# Patient Record
Sex: FEMALE | Race: ASIAN | NOT HISPANIC OR LATINO | Employment: PART TIME | URBAN - METROPOLITAN AREA
[De-identification: names, ages, dates, MRNs, and addresses within clinical notes are randomized per-mention and may not be internally consistent; named-entity substitution may affect disease eponyms.]

---

## 2017-01-15 ENCOUNTER — OFFICE VISIT (OUTPATIENT)
Dept: URGENT CARE | Facility: URGENT CARE | Age: 27
End: 2017-01-15
Payer: COMMERCIAL

## 2017-01-15 VITALS
WEIGHT: 98.4 LBS | TEMPERATURE: 98.3 F | HEART RATE: 86 BPM | OXYGEN SATURATION: 97 % | RESPIRATION RATE: 14 BRPM | DIASTOLIC BLOOD PRESSURE: 50 MMHG | BODY MASS INDEX: 20.91 KG/M2 | SYSTOLIC BLOOD PRESSURE: 108 MMHG

## 2017-01-15 DIAGNOSIS — H02.843 SWOLLEN EYELID, RIGHT: Primary | ICD-10-CM

## 2017-01-15 PROCEDURE — 99213 OFFICE O/P EST LOW 20 MIN: CPT | Performed by: PHYSICIAN ASSISTANT

## 2017-01-15 NOTE — PATIENT INSTRUCTIONS
Conjunctivitis, Allergic    Conjunctivitis is an irritation of a thin membrane in the eye. This membrane is called the conjunctiva. It covers the white of the eye and the inside of the eyelid. The condition is often known as  pink eye  or  red eye  because the eye looks pink or red. The eye can also be swollen. A thick fluid may leak from the eyelid. The eye may itch and burn, and feel gritty or scratchy.  Allergic conjunctivitis is caused by an allergen. Allergens are substances that cause the body to react with certain symptoms. Allergens that cause eye irritation include things such as house dust or pollen in the air. This can occur seasonally, most often in the spring.  Home care    Eye drops may be prescribed to reduce itching and redness. Use these as directed. Otherwise, over-the-counter decongestant eye drops may be used.    Apply a cool compress (towel soaked in cool water) to the affected eye 3 to 4 times a day to reduce swelling and itching.    It is common to have mucus drainage during the night, causing the eyelids to become crusted by morning. Use a warm, wet cloth to wipe this away. You may also use saline irrigating solution or artificial tears to rinse away mucus in the eye. Do not patch the eye.    You may use acetaminophen or ibuprofen to control pain, unless another medicine was prescribed. (Note: If you have chronic liver or kidney disease, or if you have ever had a stomach ulcer or gastrointestinal bleeding, talk with your healthcare provider before using these medicines.)    Do not wear contact lenses until your eyes have healed and all symptoms are gone.  Follow-up care  Follow up with your healthcare provider, or as advised.  When to seek medical advice  Call your healthcare provider right away if any of these occur:    Increased eyelid swelling    New or worsening drainage from the eye    Increasing redness around the eye    Facial swelling    0097-1012 [employer] [address]  [place] [address]. All rights reserved. This information is not intended as a substitute for professional medical care. Always follow your healthcare professional's instructions.

## 2017-01-15 NOTE — NURSING NOTE
"Chief Complaint   Patient presents with     Eye Problem     Pt c/o spot on the inside of the right eye.        Initial /50 mmHg  Pulse 86  Temp(Src) 98.3  F (36.8  C) (Oral)  Wt 98 lb 6.4 oz (44.634 kg)  SpO2 97%  LMP 11/29/2016 (Approximate)  Breastfeeding? No Estimated body mass index is 20.91 kg/(m^2) as calculated from the following:    Height as of 12/29/16: 4' 9.5\" (1.461 m).    Weight as of this encounter: 98 lb 6.4 oz (44.634 kg).  BP completed using cuff size: regular    Katelin Copeland CMA (AAMA)      "

## 2017-01-15 NOTE — MR AVS SNAPSHOT
After Visit Summary   1/15/2017    Sahil Pimentel    MRN: [id number]           Patient Information     Date Of Birth          1990        Visit Information        Provider Department      1/15/2017 11:20 AM Brijesh Almazan PA [facility]        Today's Diagnoses     Swollen eyelid, right    -  1       Care Instructions      Conjunctivitis, Allergic    Conjunctivitis is an irritation of a thin membrane in the eye. This membrane is called the conjunctiva. It covers the white of the eye and the inside of the eyelid. The condition is often known as  pink eye  or  red eye  because the eye looks pink or red. The eye can also be swollen. A thick fluid may leak from the eyelid. The eye may itch and burn, and feel gritty or scratchy.  Allergic conjunctivitis is caused by an allergen. Allergens are substances that cause the body to react with certain symptoms. Allergens that cause eye irritation include things such as house dust or pollen in the air. This can occur seasonally, most often in the spring.  Home care    Eye drops may be prescribed to reduce itching and redness. Use these as directed. Otherwise, over-the-counter decongestant eye drops may be used.    Apply a cool compress (towel soaked in cool water) to the affected eye 3 to 4 times a day to reduce swelling and itching.    It is common to have mucus drainage during the night, causing the eyelids to become crusted by morning. Use a warm, wet cloth to wipe this away. You may also use saline irrigating solution or artificial tears to rinse away mucus in the eye. Do not patch the eye.    You may use acetaminophen or ibuprofen to control pain, unless another medicine was prescribed. (Note: If you have chronic liver or kidney disease, or if you have ever had a stomach ulcer or gastrointestinal bleeding, talk with your healthcare provider before using these medicines.)    Do not wear contact lenses until your eyes have  healed and all symptoms are gone.  Follow-up care  Follow up with your healthcare provider, or as advised.  When to seek medical advice  Call your healthcare provider right away if any of these occur:    Increased eyelid swelling    New or worsening drainage from the eye    Increasing redness around the eye    Facial swelling    4292-0418 [employer] [address] [place] [address]. All rights reserved. This information is not intended as a substitute for professional medical care. Always follow your healthcare professional's instructions.              Follow-ups after your visit        Additional Services     OPTOMETRY REFERRAL       Your provider has referred you to: AURELIAG: [facility] [phone]   [email or web address]    Please be aware that coverage of these services is subject to the terms and limitations of your health insurance plan.  Call member services at your health plan with any benefit or coverage questions.      Please bring the following with you to your appointment:    (1) Any X-Rays, CTs or MRIs which have been performed.  Contact the facility where they were done to arrange for  prior to your scheduled appointment.    (2) List of current medications  (3) This referral request   (4) Any documents/labs given to you for this referral                  Follow-up notes from your care team     Return if symptoms worsen or fail to improve.      Who to contact     If you have questions or need follow up information about today's clinic visit or your schedule please contact [facility] directly at [phone].  Normal or non-critical lab and imaging results will be communicated to you by MyChart, letter or phone within 4 business days after the clinic has received the results. If you do not hear from us within 7 days, please contact the clinic through MyChart or phone. If you have a critical or  "abnormal lab result, we will notify you by phone as soon as possible.  Submit refill requests through Ukash or call your pharmacy and they will forward the refill request to us. Please allow 3 business days for your refill to be completed.          Additional Information About Your Visit        Motionboxhart Information     Ukash lets you send messages to your doctor, view your test results, renew your prescriptions, schedule appointments and more. To sign up, go to [email or web address] . Click on \"Log in\" on the left side of the screen, which will take you to the Welcome page. Then click on \"Sign up Now\" on the right side of the page.     You will be asked to enter the access code listed below, as well as some personal information. Please follow the directions to create your username and password.     Your access code is: [email or web address]  Expires: 3/29/2017  4:01 PM     Your access code will  in 90 days. If you need help or a new code, please call your [place] clinic or [phone].        Care EveryWhere ID     This is your Care EveryWhere ID. This could be used by other organizations to access your [place] medical records  [id number]        Your Vitals Were     Pulse Temperature Respirations Pulse Oximetry Last Period Breastfeeding?    86 98.3  F (36.8  C) (Oral) 14 97% 2016 (Approximate) No       Blood Pressure from Last 3 Encounters:   01/15/17 108/50   16 97/58    Weight from Last 3 Encounters:   01/15/17 98 lb 6.4 oz (44.634 kg)   16 99 lb 3.2 oz (44.997 kg)              We Performed the Following     OPTOMETRY REFERRAL          Today's Medication Changes          These changes are accurate as of: 1/15/17 12:22 PM.  If you have any questions, ask your nurse or doctor.               Start taking these medicines.        Dose/Directions    ketotifen 0.025 % Soln ophthalmic solution   Commonly known as:  ZADITOR   Used for:  Swollen eyelid, right   Started by:  Brijesh Almazan " GILES Langford        Dose:  1 drop   Apply 1 drop to eye every 12 hours   Quantity:  1 Bottle   Refills:  0            Where to get your medicines      These medications were sent to [facility] [place] - [place], [place] - [address]  [address], [place] [address]     Phone:  [phone]    - ketotifen 0.025 % Soln ophthalmic solution             Primary Care Provider    None Specified       No primary provider on file.        Thank you!     Thank you for choosing [facility]  for your care. Our goal is always to provide you with excellent care. Hearing back from our patients is one way we can continue to improve our services. Please take a few minutes to complete the written survey that you may receive in the mail after your visit with us. Thank you!             Your Updated Medication List - Protect others around you: Learn how to safely use, store and throw away your medicines at www.disposemymeds.org.          This list is accurate as of: 1/15/17 12:22 PM.  Always use your most recent med list.                   Brand Name Dispense Instructions for use    ketotifen 0.025 % Soln ophthalmic solution    ZADITOR    1 Bottle    Apply 1 drop to eye every 12 hours

## 2017-01-15 NOTE — PROGRESS NOTES
SUBJECTIVE:                                                    Sahil Pimentel is a 26 year old female who presents to clinic today for the following health issues:       Eye(s) Problem  Rt lower lateral eye lid with swelling and pain a few days ago , swelling has improved significantly, and there is no pain now, but notes some blurry vision and a bump in the lateral eye lid crease region    Duration: 2-3 days    Description:  Location: right  Pain: no   Redness: YES  Discharge: no     Accompanying signs and symptoms:     History (Trauma, foreign body exposure,): None    Precipitating or alleviating factors (contact use): None    Therapies tried and outcome: None      visin intact      service used over the phone      No Known Allergies    No past medical history on file.      No current outpatient prescriptions on file prior to visit.  No current facility-administered medications on file prior to visit.    Social History   Substance Use Topics     Smoking status: Never Smoker      Smokeless tobacco: Not on file     Alcohol Use: No       ROS:  General: negative for fever  EYE: as above  ENT: as above    OBJECTIVE:  /50 mmHg  Pulse 86  Temp(Src) 98.3  F (36.8  C) (Oral)  Resp 14  Wt 98 lb 6.4 oz (44.634 kg)  SpO2 97%  LMP 11/29/2016 (Approximate)  Breastfeeding? No     corrected  Rt eye 20/20, left 20/20, both 20/20  General:   awake, alert, and cooperative.  NAD.   Head: Normocephalic, atraumatic.  Eyes: rt Conjunctiva clear,no exudates EOMI, PERRLA, no FB,   Rt lower lateral lid, at the lateral crease, with 5 x 2 mm region of prominent mucosal swelling, no drainage, no fluctuance, no erythema  Lungs: No respiratory distress  Neuro: Alert and oriented - normal speech.    ASSESSMENT:improving allergic rxn?    ICD-10-CM    1. Swollen eyelid, right H02.843 ketotifen (ZADITOR) 0.025 % SOLN ophthalmic solution     OPTOMETRY REFERRAL         PLAN:   Advised about symptoms which might herald more  serious problems.

## 2017-10-26 ENCOUNTER — OFFICE VISIT (OUTPATIENT)
Dept: NURSING | Facility: CLINIC | Age: 27
End: 2017-10-26
Payer: COMMERCIAL

## 2017-10-26 DIAGNOSIS — Z23 NEED FOR PROPHYLACTIC VACCINATION AND INOCULATION AGAINST INFLUENZA: Primary | ICD-10-CM

## 2017-10-26 PROCEDURE — 99207 ZZC NO CHARGE NURSE ONLY: CPT

## 2017-10-26 PROCEDURE — 90686 IIV4 VACC NO PRSV 0.5 ML IM: CPT

## 2017-10-26 PROCEDURE — 90471 IMMUNIZATION ADMIN: CPT

## 2017-10-26 NOTE — MR AVS SNAPSHOT
"              After Visit Summary   10/26/2017    Sahil Pimentel    MRN: [id number]           Patient Information     Date Of Birth          1990        Visit Information        Provider Department      10/26/2017 8:45 AM [employer]IE [employer]RAM[employer]; [facility]CILL[facility]        Today's Diagnoses     Need for prophylactic vaccination and inoculation against influenza    -  1       Follow-ups after your visit        Who to contact     If you have questions or need follow up information about today's clinic visit or your schedule please contact [facility] directly at [phone].  Normal or non-critical lab and imaging results will be communicated to you by Populishart, letter or phone within 4 business days after the clinic has received the results. If you do not hear from us within 7 days, please contact the clinic through Populishart or phone. If you have a critical or abnormal lab result, we will notify you by phone as soon as possible.  Submit refill requests through MogiMe or call your pharmacy and they will forward the refill request to us. Please allow 3 business days for your refill to be completed.          Additional Information About Your Visit        MyChart Information     MogiMe lets you send messages to your doctor, view your test results, renew your prescriptions, schedule appointments and more. To sign up, go to [email or web address] . Click on \"Log in\" on the left side of the screen, which will take you to the Welcome page. Then click on \"Sign up Now\" on the right side of the page.     You will be asked to enter the access code listed below, as well as some personal information. Please follow the directions to create your username and password.     Your access code is: [email or web address]  Expires: 2018  9:10 AM     Your access code will  in 90 days. If you need help or a new code, please call your [facility] or [phone].        Care EveryWhere " ID     This is your Care EveryWhere ID. This could be used by other organizations to access your [place] medical records  [id number]         Blood Pressure from Last 3 Encounters:   01/15/17 108/50   12/29/16 97/58    Weight from Last 3 Encounters:   01/15/17 98 lb 6.4 oz (44.6 kg)   12/29/16 99 lb 3.2 oz (45 kg)              We Performed the Following     FLU VAC, SPLIT VIRUS IM > 3 YO (QUADRIVALENT) [35711]     Vaccine Administration, Initial [75625]        Primary Care Provider Fax #    Provider Not In System [phone]                Equal Access to Services     ANTHONY SIMPSON : Hadii aad tori hadabram Somelissa, wazaidda lubolivaradaha, christa kaalmada tyra, jorge reyes . So [facility] [phone].    ATENCIÓN: Si habla español, tiene a guallpa disposición servicios gratuitos de asistencia lingüística. Llame al [phone].    We comply with applicable federal civil rights laws and [place] laws. We do not discriminate on the basis of race, color, national origin, age, disability, sex, sexual orientation, or gender identity.            Thank you!     Thank you for choosing [facility]  for your care. Our goal is always to provide you with excellent care. Hearing back from our patients is one way we can continue to improve our services. Please take a few minutes to complete the written survey that you may receive in the mail after your visit with us. Thank you!             Your Updated Medication List - Protect others around you: Learn how to safely use, store and throw away your medicines at www.disposemymeds.org.          This list is accurate as of: 10/26/17  9:10 AM.  Always use your most recent med list.                   Brand Name Dispense Instructions for use Diagnosis    ketotifen 0.025 % Soln ophthalmic solution    ZADITOR    1 Bottle    Apply 1 drop to eye every 12 hours    Swollen eyelid, right

## 2017-10-26 NOTE — NURSING NOTE

## 2019-10-07 ENCOUNTER — OFFICE VISIT (OUTPATIENT)
Dept: FAMILY MEDICINE | Facility: CLINIC | Age: 29
End: 2019-10-07
Payer: COMMERCIAL

## 2019-10-07 VITALS
HEIGHT: 58 IN | OXYGEN SATURATION: 97 % | DIASTOLIC BLOOD PRESSURE: 70 MMHG | SYSTOLIC BLOOD PRESSURE: 108 MMHG | TEMPERATURE: 97.1 F | WEIGHT: 103 LBS | RESPIRATION RATE: 14 BRPM | HEART RATE: 77 BPM | BODY MASS INDEX: 21.62 KG/M2

## 2019-10-07 DIAGNOSIS — Z00.00 ROUTINE GENERAL MEDICAL EXAMINATION AT A HEALTH CARE FACILITY: Primary | ICD-10-CM

## 2019-10-07 DIAGNOSIS — Z13.6 CARDIOVASCULAR SCREENING; LDL GOAL LESS THAN 100: ICD-10-CM

## 2019-10-07 LAB
CHOLEST SERPL-MCNC: 151 MG/DL
GLUCOSE SERPL-MCNC: 81 MG/DL (ref 70–99)
HDLC SERPL-MCNC: 49 MG/DL
LDLC SERPL CALC-MCNC: 92 MG/DL
NONHDLC SERPL-MCNC: 102 MG/DL
TRIGL SERPL-MCNC: 52 MG/DL

## 2019-10-07 PROCEDURE — 82947 ASSAY GLUCOSE BLOOD QUANT: CPT | Performed by: PHYSICIAN ASSISTANT

## 2019-10-07 PROCEDURE — 90471 IMMUNIZATION ADMIN: CPT | Performed by: PHYSICIAN ASSISTANT

## 2019-10-07 PROCEDURE — 99395 PREV VISIT EST AGE 18-39: CPT | Mod: 25 | Performed by: PHYSICIAN ASSISTANT

## 2019-10-07 PROCEDURE — 36415 COLL VENOUS BLD VENIPUNCTURE: CPT | Performed by: PHYSICIAN ASSISTANT

## 2019-10-07 PROCEDURE — 80061 LIPID PANEL: CPT | Performed by: PHYSICIAN ASSISTANT

## 2019-10-07 PROCEDURE — 90686 IIV4 VACC NO PRSV 0.5 ML IM: CPT | Performed by: PHYSICIAN ASSISTANT

## 2019-10-07 ASSESSMENT — MIFFLIN-ST. JEOR: SCORE: 1071.95

## 2019-10-07 NOTE — LETTER
October 8, 2019      Sahil Pimentel  [address]  [place] [address]        Dear ,    We are writing to inform you of your test results.      -HDL(good) cholesterol level is low which can increase your heart disease risk.  A diet high in fat and simple carbohydrates, genetics can contribute to this. Your LDL(bad) cholesterol and trigylceride levels are normal.  ADVISE: exercising 150 minutes of aerobic exercise per week (30 minutes 5 days per week or 50 minutes 3 days per week are options), and omega-3 fatty acids (fish oil) 0963-7520 mg daily are helpful to improve this.  In 12 months, you should recheck your fasting cholesterol panel by scheduling a lab-only appointment.  -Glucose (diabetic screening test) is normal.      Resulted Orders   Glucose   Result Value Ref Range    Glucose 81 70 - 99 mg/dL      Comment:      Fasting specimen   Lipid Profile (Chol, Trig, HDL, LDL calc)   Result Value Ref Range    Cholesterol 151 <200 mg/dL    Triglycerides 52 <150 mg/dL      Comment:      Fasting specimen    HDL Cholesterol 49 (L) >49 mg/dL    LDL Cholesterol Calculated 92 <100 mg/dL      Comment:      Desirable:       <100 mg/dl    Non HDL Cholesterol 102 <130 mg/dL       If you have any questions or concerns, please call the clinic at the number listed above.       Sincerely,          Marcellus Sherwood PA-C

## 2019-10-07 NOTE — PROGRESS NOTES
SUBJECTIVE:   CC: Sahil Pimentel is an 29 year old woman who presents for preventive health visit.     Healthy Habits:    Do you get at least three servings of calcium containing foods daily (dairy, green leafy vegetables, etc.)? yes    Amount of exercise or daily activities, outside of work: 15 min/day    Problems taking medications regularly No    Medication side effects: No    Have you had an eye exam in the past two years? no    Do you see a dentist twice per year? no    Do you have sleep apnea, excessive snoring or daytime drowsiness?no          Today's PHQ-2 Score:   PHQ-2 ( 1999 Pfizer) 10/7/2019 12/29/2016   Q1: Little interest or pleasure in doing things 0 0   Q2: Feeling down, depressed or hopeless 0 0   PHQ-2 Score 0 0       Abuse: Current or Past(Physical, Sexual or Emotional)- No  Do you feel safe in your environment? Yes    Social History     Tobacco Use     Smoking status: Never Smoker     Smokeless tobacco: Never Used   Substance Use Topics     Alcohol use: No     Alcohol/week: 0.0 standard drinks     If you drink alcohol do you typically have >3 drinks per day or >7 drinks per week? No                     Reviewed orders with patient.  Reviewed health maintenance and updated orders accordingly - Yes  Patient Active Problem List   Diagnosis     Routine history and physical examination of adult     Immigrant with language difficulty     Past Surgical History:   Procedure Laterality Date     NO HISTORY OF SURGERY         Social History     Tobacco Use     Smoking status: Never Smoker     Smokeless tobacco: Never Used   Substance Use Topics     Alcohol use: No     Alcohol/week: 0.0 standard drinks     Family History   Problem Relation Age of Onset     Family History Negative Mother      Glaucoma No family hx of      Macular Degeneration No family hx of      Retinal detachment No family hx of          No current outpatient medications on file.     No Known Allergies  No lab results found.     Mammogram  "not appropriate for this patient based on age.    Pertinent mammograms are reviewed under the imaging tab.  History of abnormal Pap smear: NO - age 30-65 PAP every 5 years with negative HPV co-testing recommended  PAP / HPV 12/29/2016   PAP NIL     Reviewed and updated as needed this visit by clinical staff  Tobacco  Allergies  Meds  Fam Hx  Soc Hx        Reviewed and updated as needed this visit by Provider  Tobacco  Fam Hx  Soc Hx       Past Medical History:   Diagnosis Date     NO ACTIVE PROBLEMS       Past Surgical History:   Procedure Laterality Date     NO HISTORY OF SURGERY       OB History   No obstetric history on file.       ROS:  CONSTITUTIONAL: NEGATIVE for fever, chills, change in weight  INTEGUMENTARU/SKIN: NEGATIVE for worrisome rashes, moles or lesions  EYES: NEGATIVE for vision changes or irritation  ENT: NEGATIVE for ear, mouth and throat problems  RESP: NEGATIVE for significant cough or SOB  BREAST: NEGATIVE for masses, tenderness or discharge  CV: NEGATIVE for chest pain, palpitations or peripheral edema  GI: NEGATIVE for nausea, abdominal pain, heartburn, or change in bowel habits  : NEGATIVE for unusual urinary or vaginal symptoms. Periods are regular.  MUSCULOSKELETAL: NEGATIVE for significant arthralgias or myalgia  NEURO: NEGATIVE for weakness, dizziness or paresthesias  PSYCHIATRIC: NEGATIVE for changes in mood or affect    OBJECTIVE:   /70   Pulse 77   Temp 97.1  F (36.2  C) (Tympanic)   Resp 14   Ht 1.473 m (4' 10\")   Wt 46.7 kg (103 lb)   LMP 09/07/2019   SpO2 97%   BMI 21.53 kg/m    EXAM:  GENERAL: healthy, alert and no distress  EYES: Eyes grossly normal to inspection, PERRL and conjunctivae and sclerae normal  HENT: ear canals and TM's normal, nose and mouth without ulcers or lesions  NECK: no adenopathy, no asymmetry, masses, or scars and thyroid normal to palpation  RESP: lungs clear to auscultation - no rales, rhonchi or wheezes  BREAST: normal without masses, " "tenderness or nipple discharge and no palpable axillary masses or adenopathy  CV: regular rate and rhythm, normal S1 S2, no S3 or S4, no murmur, click or rub, no peripheral edema   ABDOMEN: soft, nontender, no hepatosplenomegaly, no masses and bowel sounds normal  MS: no gross musculoskeletal defects noted, no edema  SKIN: no suspicious lesions or rashes  NEURO: Normal strength and tone, mentation intact and speech normal  PSYCH: mentation appears normal, affect normal/bright    Diagnostic Test Results:  Labs reviewed in Epic    ASSESSMENT/PLAN:   1. Routine general medical examination at a health care facility    2. CARDIOVASCULAR SCREENING; LDL GOAL LESS THAN 100  - Glucose  - Lipid Profile (Chol, Trig, HDL, LDL calc)    COUNSELING:   Reviewed preventive health counseling, as reflected in patient instructions       Regular exercise       Healthy diet/nutrition       Immunizations    Vaccinated for: Influenza          Estimated body mass index is 21.53 kg/m  as calculated from the following:    Height as of this encounter: 1.473 m (4' 10\").    Weight as of this encounter: 46.7 kg (103 lb).         reports that she has never smoked. She has never used smokeless tobacco.      Counseling Resources:  ATP IV Guidelines  Pooled Cohorts Equation Calculator  Breast Cancer Risk Calculator  FRAX Risk Assessment  ICSI Preventive Guidelines  Dietary Guidelines for Americans, 2010  USDA's MyPlate  ASA Prophylaxis  Lung CA Screening    Marcellus Sherwood PA-C  [facility]  "

## 2019-10-08 NOTE — RESULT ENCOUNTER NOTE
Letter sent to patient with results.    Marcellus Sherwood PA-C  [facility]

## 2020-03-10 ENCOUNTER — HEALTH MAINTENANCE LETTER (OUTPATIENT)
Age: 30
End: 2020-03-10

## 2020-11-02 ENCOUNTER — OFFICE VISIT (OUTPATIENT)
Dept: FAMILY MEDICINE | Facility: CLINIC | Age: 30
End: 2020-11-02
Payer: COMMERCIAL

## 2020-11-02 VITALS
BODY MASS INDEX: 21.53 KG/M2 | DIASTOLIC BLOOD PRESSURE: 58 MMHG | WEIGHT: 103 LBS | SYSTOLIC BLOOD PRESSURE: 98 MMHG | HEART RATE: 80 BPM | OXYGEN SATURATION: 99 % | TEMPERATURE: 97 F

## 2020-11-02 DIAGNOSIS — Z32.01 PREGNANCY TEST POSITIVE: Primary | ICD-10-CM

## 2020-11-02 DIAGNOSIS — Z23 NEED FOR PROPHYLACTIC VACCINATION AND INOCULATION AGAINST INFLUENZA: ICD-10-CM

## 2020-11-02 LAB — HCG UR QL: POSITIVE

## 2020-11-02 PROCEDURE — 99213 OFFICE O/P EST LOW 20 MIN: CPT | Mod: 25 | Performed by: PHYSICIAN ASSISTANT

## 2020-11-02 PROCEDURE — 81025 URINE PREGNANCY TEST: CPT | Performed by: PHYSICIAN ASSISTANT

## 2020-11-02 PROCEDURE — 90471 IMMUNIZATION ADMIN: CPT | Performed by: PHYSICIAN ASSISTANT

## 2020-11-02 PROCEDURE — 90686 IIV4 VACC NO PRSV 0.5 ML IM: CPT | Performed by: PHYSICIAN ASSISTANT

## 2020-11-02 RX ORDER — PRENATAL VIT/IRON FUM/FOLIC AC 27MG-0.8MG
1 TABLET ORAL DAILY
Qty: 90 TABLET | Refills: 3 | Status: SHIPPED | OUTPATIENT
Start: 2020-11-02 | End: 2024-07-29

## 2020-11-02 NOTE — PROGRESS NOTES
Subjective     Sahil Pimentel is a 30 year old female who presents to clinic today for the following health issues:    HPI       Pregnancy confirmation:    LMP: 2020.  Sahil is a  female, she is here with her  today to confirm that she is pregnant. She took a home pregnancy test 2 days ago and this was positive.  They have been trying to become pregnant and are excited by this news.  She is not having any abdominal pain, bleeding, n/v or fevers.           Review of Systems   Constitutional, HEENT, cardiovascular, pulmonary, gi and gu systems are negative, except as otherwise noted.      Objective    BP 98/58   Pulse 80   Temp 97  F (36.1  C)   Wt 46.7 kg (103 lb)   LMP 2020   SpO2 99%   BMI 21.53 kg/m    Body mass index is 21.53 kg/m .  Physical Exam   GENERAL: healthy, alert and no distress  RESP: lungs clear to auscultation - no rales, rhonchi or wheezes  CV: regular rate and rhythm, normal S1 S2, no S3 or S4, no murmur, click or rub, no peripheral edema   ABDOMEN: soft, nontender, no hepatosplenomegaly, no masses and bowel sounds normal  PSYCH: mentation appears normal, affect normal/bright        Results for orders placed or performed in visit on 20   HCG Qual, Urine ([id number])     Status: Abnormal   Result Value Ref Range    HCG Qual Urine Positive (A) NEG^Negative         Assessment & Plan     Pregnancy test positive  Prenatal vitamin prescribed.  Discussed positive test including diet and exercise recommendations and next steps.  She would like a referral [facility].  Advised that she is approx 6 weeks pregnant, estimated due date in 2020. All questions answered  - HCG Qual, Urine ([id number])  - Prenatal Vit-Fe Fumarate-FA (PRENATAL MULTIVITAMIN W/IRON) 27-0.8 MG tablet; Take 1 tablet by mouth daily  - OB/GYN REFERRAL    Need for prophylactic vaccination and inoculation against influenza  - INFLUENZA VACCINE IM > 6 MONTHS VALENT IIV4 [01401]            Return in about 2  weeks (around 11/16/2020) for please follow up in 2 weeks with your OBGYN.    AJAY Mccall [facility] [address] [place]

## 2020-12-27 ENCOUNTER — HEALTH MAINTENANCE LETTER (OUTPATIENT)
Age: 30
End: 2020-12-27

## 2021-01-19 ENCOUNTER — TELEPHONE (OUTPATIENT)
Dept: FAMILY MEDICINE | Facility: CLINIC | Age: 31
End: 2021-01-19

## 2021-01-19 NOTE — TELEPHONE ENCOUNTER
Needs of attention regarding:  -Cervical Cancer Screening    Health Maintenance Topics with due status: Overdue       Topic Date Due    HIV SCREENING 03/25/2003    HEPATITIS C SCREENING 03/25/2006    EYE EXAM 12/02/2017    PAP 12/29/2019    PREVENTIVE CARE VISIT 10/07/2020    PHQ-2 01/01/2021       Communication:  See Letter

## 2021-01-19 NOTE — LETTER
January 19, 2021      Sahil Pimentel  [address]  [place] [address]        Dear Sahil,    I care about your health and have reviewed your health plan. I have reviewed your medical conditions, medication list, and lab results and am making recommendations based on this review, to better manage your health.    You are in particular need of attention regarding:  -Cervical Cancer Screening    I am recommending that you:  -schedule a PAP SMEAR EXAM which is due.  Please disregard this reminder if you have had this exam elsewhere within the last year.  It would be helpful for us to have a copy of your recent pap smear report in our file so that we can best coordinate your care.    Here is a list of Health Maintenance topics that are due now or due soon:  Health Maintenance Due   Topic Date Due     HIV Screening  03/25/2003     Hepatitis C Screening  03/25/2006     Eye Exam  12/02/2017     PAP Smear  12/29/2019     Preventive Care Visit  10/07/2020     PHQ-2  01/01/2021       Please call us at [phone] (or use Yapmo) to address the above recommendations.     Thank you for trusting [facility] and we appreciate the opportunity to serve you.  We look forward to supporting your healthcare needs in the future.    Healthy Regards,    Marcellus Sherwood, MPH, PA-C

## 2021-04-21 ENCOUNTER — TELEPHONE (OUTPATIENT)
Dept: FAMILY MEDICINE | Facility: CLINIC | Age: 31
End: 2021-04-21

## 2021-04-21 NOTE — TELEPHONE ENCOUNTER
Patient Quality Outreach      Summary:    Patient has the following on her problem list/HM: None    Patient is due/failing the following:   Cervical Cancer Screening - PAP Needed    Type of outreach:    Sent Vinylmint message.    Questions for provider review:    None                                                                                                                                     Nayely Page [facility]       Chart routed to .

## 2021-09-17 ENCOUNTER — OFFICE VISIT (OUTPATIENT)
Dept: URGENT CARE | Facility: URGENT CARE | Age: 31
End: 2021-09-17
Payer: COMMERCIAL

## 2021-09-17 VITALS
SYSTOLIC BLOOD PRESSURE: 96 MMHG | DIASTOLIC BLOOD PRESSURE: 62 MMHG | OXYGEN SATURATION: 96 % | HEART RATE: 80 BPM | WEIGHT: 112.9 LBS | BODY MASS INDEX: 23.6 KG/M2 | TEMPERATURE: 98.2 F

## 2021-09-17 DIAGNOSIS — N30.00 ACUTE CYSTITIS WITHOUT HEMATURIA: Primary | ICD-10-CM

## 2021-09-17 DIAGNOSIS — N89.8 VAGINAL LESION: ICD-10-CM

## 2021-09-17 DIAGNOSIS — Z78.9 BREASTFEEDING (INFANT): ICD-10-CM

## 2021-09-17 DIAGNOSIS — R30.0 DYSURIA: ICD-10-CM

## 2021-09-17 LAB
ALBUMIN UR-MCNC: ABNORMAL MG/DL
APPEARANCE UR: CLEAR
BACTERIA #/AREA URNS HPF: ABNORMAL /HPF
BILIRUB UR QL STRIP: NEGATIVE
COLOR UR AUTO: YELLOW
GLUCOSE UR STRIP-MCNC: NEGATIVE MG/DL
HGB UR QL STRIP: ABNORMAL
KETONES UR STRIP-MCNC: NEGATIVE MG/DL
LEUKOCYTE ESTERASE UR QL STRIP: ABNORMAL
MUCOUS THREADS #/AREA URNS LPF: PRESENT /LPF
NITRATE UR QL: POSITIVE
PH UR STRIP: 6 [PH] (ref 5–7)
RBC #/AREA URNS AUTO: ABNORMAL /HPF
SP GR UR STRIP: 1.02 (ref 1–1.03)
SQUAMOUS #/AREA URNS AUTO: ABNORMAL /LPF
UROBILINOGEN UR STRIP-ACNC: 0.2 E.U./DL
WBC #/AREA URNS AUTO: ABNORMAL /HPF

## 2021-09-17 PROCEDURE — 87529 HSV DNA AMP PROBE: CPT | Performed by: PHYSICIAN ASSISTANT

## 2021-09-17 PROCEDURE — 99214 OFFICE O/P EST MOD 30 MIN: CPT | Performed by: PHYSICIAN ASSISTANT

## 2021-09-17 PROCEDURE — 81001 URINALYSIS AUTO W/SCOPE: CPT | Performed by: PHYSICIAN ASSISTANT

## 2021-09-17 PROCEDURE — 87086 URINE CULTURE/COLONY COUNT: CPT | Performed by: PHYSICIAN ASSISTANT

## 2021-09-17 RX ORDER — SULFAMETHOXAZOLE/TRIMETHOPRIM 800-160 MG
1 TABLET ORAL 2 TIMES DAILY
Qty: 6 TABLET | Refills: 0 | Status: SHIPPED | OUTPATIENT
Start: 2021-09-17 | End: 2021-09-20

## 2021-09-17 RX ORDER — ACYCLOVIR 400 MG/1
400 TABLET ORAL EVERY 8 HOURS
Qty: 30 TABLET | Refills: 0 | Status: SHIPPED | OUTPATIENT
Start: 2021-09-17 | End: 2021-09-27

## 2021-09-17 NOTE — PROGRESS NOTES
Chief Complaint   Patient presents with     UTI         Medical Decision Making:    Differential Diagnosis:  UTI: UTI, Urethritis, Vaginitis and STD    Results for orders placed or performed in visit on 09/17/21   UA macro with reflex to Microscopic and Culture - Clinc Collect     Status: Abnormal    Specimen: Urine, Clean Catch   Result Value Ref Range    Color Urine Yellow Colorless, Straw, Light Yellow, Yellow    Appearance Urine Clear Clear    Glucose Urine Negative Negative mg/dL    Bilirubin Urine Negative Negative    Ketones Urine Negative Negative mg/dL    Specific Gravity Urine 1.025 1.003 - 1.035    Blood Urine Small (A) Negative    pH Urine 6.0 5.0 - 7.0    Protein Albumin Urine Trace (A) Negative mg/dL    Urobilinogen Urine 0.2 0.2, 1.0 E.U./dL    Nitrite Urine Positive (A) Negative    Leukocyte Esterase Urine Small (A) Negative   Urine Microscopic Exam     Status: Abnormal   Result Value Ref Range    Bacteria Urine Few (A) None Seen /HPF    RBC Urine 0-2 0-2 /HPF /HPF    WBC Urine 5-10 (A) 0-5 /HPF /HPF    Squamous Epithelials Urine Few (A) None Seen /LPF    Mucus Urine Present (A) None Seen /LPF         ASSESSMENT:    ICD-10-CM    1. Acute cystitis without hematuria  N30.00    2. Dysuria  R30.0 UA macro with reflex to Microscopic and Culture - Clinc Collect     Urine Microscopic Exam     Urine Culture     sulfamethoxazole-trimethoprim (BACTRIM DS) 800-160 MG tablet             PLAN: UA does show bacteria, positive nitrites and 5-10 WBCs.  Vaseline barrier as needed prior to voiding.  Urine culture and herpes culture pending.  Oral Bactrim and oral acyclovir.  As per ordered above.  Drink plenty of fluids.  Prevention and treatment of UTI's discussed. Follow up with primary care physician if not improving.  Advised about symptoms which might herald more serious problems.          Esperanza Evans PA-C        Subjective:  32 yo with dysuria x 2-3 days.  Also noted some left-sided painful vaginal lesions.  No  prior episodes of this in the past.  No N/V. No fever. Breastfeeding her 2-1/2-month-old.. No abnl vag discharge.      No Known Allergies    Past Medical History:   Diagnosis Date     NO ACTIVE PROBLEMS        Prenatal Vit-Fe Fumarate-FA (PRENATAL MULTIVITAMIN W/IRON) 27-0.8 MG tablet, Take 1 tablet by mouth daily    No current facility-administered medications on file prior to visit.      Social History     Tobacco Use     Smoking status: Never Smoker     Smokeless tobacco: Never Used   Substance Use Topics     Alcohol use: No     Alcohol/week: 0.0 standard drinks     Drug use: No       ROS:  General: negative for fever  ABD: Denies abd pain  : as above    OBJECTIVE:  BP 96/62 (BP Location: Right arm, Patient Position: Sitting, Cuff Size: Adult Regular)   Pulse 80   Temp 98.2  F (36.8  C) (Tympanic)   Wt 51.2 kg (112 lb 14.4 oz)   SpO2 96%   Breastfeeding No   BMI 23.60 kg/m     General:   awake, alert, and cooperative.  NAD.   Head: Normocephalic, atraumatic.  Eyes: Conjunctiva clear, non icteric.   ABD: soft, no tenderness to palpation , no rigidity, guarding or rebound . No CVAT  Neuro: Alert and oriented - normal speech.   Vaginal-inner labia just below the clitoris is with 2 ulcerative type lesions.  Swab obtained.  Could be herpes.  However she states she accidentally scratched herself in this area and it could be abrasion from that.

## 2021-09-18 ENCOUNTER — TELEPHONE (OUTPATIENT)
Dept: URGENT CARE | Facility: URGENT CARE | Age: 31
End: 2021-09-18

## 2021-09-18 LAB
BACTERIA UR CULT: NO GROWTH
HSV1 DNA SPEC QL NAA+PROBE: DETECTED
HSV2 DNA SPEC QL NAA+PROBE: NOT DETECTED

## 2021-09-18 NOTE — TELEPHONE ENCOUNTER
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 Recommended she continue acyclovir as prescribed. Discussed contagiousness and recommended consistent condom use. Recommended f/u with PCP to discuss if suppressive medication should be used. Questions answered.

## 2021-10-09 ENCOUNTER — HEALTH MAINTENANCE LETTER (OUTPATIENT)
Age: 31
End: 2021-10-09

## 2022-01-29 ENCOUNTER — HEALTH MAINTENANCE LETTER (OUTPATIENT)
Age: 32
End: 2022-01-29

## 2022-06-17 ENCOUNTER — TELEPHONE (OUTPATIENT)
Dept: FAMILY MEDICINE | Facility: CLINIC | Age: 32
End: 2022-06-17
Payer: COMMERCIAL

## 2022-06-17 NOTE — TELEPHONE ENCOUNTER
Patient and  calling - tested positive for Covid 6/16/22.  They are wondering if their are restrictions for medications Pt can take for Covid as she is three months pregnant.     Pt has been meeting with an OB at an outside clinic - they plan to reach out to them to get more specific advisement.     Advised to call back with any further questions or concerns.     Merle Goins RN, BSN  [facility]

## 2022-09-11 ENCOUNTER — HEALTH MAINTENANCE LETTER (OUTPATIENT)
Age: 32
End: 2022-09-11

## 2022-11-02 DIAGNOSIS — Z32.01 PREGNANCY TEST POSITIVE: ICD-10-CM

## 2022-11-04 RX ORDER — PRENATAL VIT/IRON FUM/FOLIC AC 27MG-0.8MG
1 TABLET ORAL DAILY
Qty: 90 TABLET | Refills: 0 | OUTPATIENT
Start: 2022-11-04

## 2022-11-14 ENCOUNTER — LAB REQUISITION (OUTPATIENT)
Dept: LAB | Facility: CLINIC | Age: 32
End: 2022-11-14
Payer: COMMERCIAL

## 2022-11-14 DIAGNOSIS — O99.019 ANEMIA COMPLICATING PREGNANCY, UNSPECIFIED TRIMESTER: ICD-10-CM

## 2022-11-14 DIAGNOSIS — Z36.9 ENCOUNTER FOR ANTENATAL SCREENING, UNSPECIFIED: ICD-10-CM

## 2022-11-14 LAB
ERYTHROCYTE [DISTWIDTH] IN BLOOD BY AUTOMATED COUNT: 14 % (ref 10–15)
FERRITIN SERPL-MCNC: 13 NG/ML (ref 6–175)
HCT VFR BLD AUTO: 29.6 % (ref 35–47)
HGB BLD-MCNC: 9.2 G/DL (ref 11.7–15.7)
MCH RBC QN AUTO: 25.6 PG (ref 26.5–33)
MCHC RBC AUTO-ENTMCNC: 31.1 G/DL (ref 31.5–36.5)
MCV RBC AUTO: 82 FL (ref 78–100)
PLATELET # BLD AUTO: 206 10E3/UL (ref 150–450)
RBC # BLD AUTO: 3.6 10E6/UL (ref 3.8–5.2)
WBC # BLD AUTO: 4.5 10E3/UL (ref 4–11)

## 2022-11-14 PROCEDURE — 87653 STREP B DNA AMP PROBE: CPT | Mod: ORL | Performed by: NURSE PRACTITIONER

## 2022-11-14 PROCEDURE — 82728 ASSAY OF FERRITIN: CPT | Mod: ORL | Performed by: NURSE PRACTITIONER

## 2022-11-14 PROCEDURE — 85027 COMPLETE CBC AUTOMATED: CPT | Mod: ORL | Performed by: NURSE PRACTITIONER

## 2022-11-15 LAB — GP B STREP DNA SPEC QL NAA+PROBE: NEGATIVE

## 2023-05-06 ENCOUNTER — HEALTH MAINTENANCE LETTER (OUTPATIENT)
Age: 33
End: 2023-05-06

## 2024-06-03 ENCOUNTER — LAB REQUISITION (OUTPATIENT)
Dept: LAB | Facility: CLINIC | Age: 34
End: 2024-06-03

## 2024-06-03 DIAGNOSIS — Z13.220 ENCOUNTER FOR SCREENING FOR LIPOID DISORDERS: ICD-10-CM

## 2024-06-03 DIAGNOSIS — Z13.1 ENCOUNTER FOR SCREENING FOR DIABETES MELLITUS: ICD-10-CM

## 2024-06-03 LAB
CHOLEST SERPL-MCNC: 157 MG/DL
FASTING STATUS PATIENT QL REPORTED: ABNORMAL
HBA1C MFR BLD: 5.6 %
HDLC SERPL-MCNC: 38 MG/DL
LDLC SERPL CALC-MCNC: 101 MG/DL
NONHDLC SERPL-MCNC: 119 MG/DL
TRIGL SERPL-MCNC: 91 MG/DL

## 2024-06-03 PROCEDURE — 80061 LIPID PANEL: CPT

## 2024-06-03 PROCEDURE — 83036 HEMOGLOBIN GLYCOSYLATED A1C: CPT

## 2024-07-13 ENCOUNTER — HEALTH MAINTENANCE LETTER (OUTPATIENT)
Age: 34
End: 2024-07-13

## 2024-07-24 SDOH — HEALTH STABILITY: PHYSICAL HEALTH: ON AVERAGE, HOW MANY MINUTES DO YOU ENGAGE IN EXERCISE AT THIS LEVEL?: 10 MIN

## 2024-07-24 SDOH — HEALTH STABILITY: PHYSICAL HEALTH: ON AVERAGE, HOW MANY DAYS PER WEEK DO YOU ENGAGE IN MODERATE TO STRENUOUS EXERCISE (LIKE A BRISK WALK)?: 1 DAY

## 2024-07-24 ASSESSMENT — SOCIAL DETERMINANTS OF HEALTH (SDOH): HOW OFTEN DO YOU GET TOGETHER WITH FRIENDS OR RELATIVES?: ONCE A WEEK

## 2024-07-29 ENCOUNTER — OFFICE VISIT (OUTPATIENT)
Dept: FAMILY MEDICINE | Facility: CLINIC | Age: 34
End: 2024-07-29
Payer: COMMERCIAL

## 2024-07-29 VITALS
HEIGHT: 58 IN | DIASTOLIC BLOOD PRESSURE: 60 MMHG | OXYGEN SATURATION: 98 % | SYSTOLIC BLOOD PRESSURE: 95 MMHG | TEMPERATURE: 97.2 F | RESPIRATION RATE: 20 BRPM | HEART RATE: 89 BPM | BODY MASS INDEX: 22.25 KG/M2 | WEIGHT: 106 LBS

## 2024-07-29 DIAGNOSIS — D50.8 IRON DEFICIENCY ANEMIA SECONDARY TO INADEQUATE DIETARY IRON INTAKE: ICD-10-CM

## 2024-07-29 DIAGNOSIS — R55 VASOVAGAL REACTION: ICD-10-CM

## 2024-07-29 DIAGNOSIS — Z00.00 ROUTINE GENERAL MEDICAL EXAMINATION AT A HEALTH CARE FACILITY: Primary | ICD-10-CM

## 2024-07-29 DIAGNOSIS — Z11.59 NEED FOR HEPATITIS C SCREENING TEST: ICD-10-CM

## 2024-07-29 DIAGNOSIS — Z13.1 ENCOUNTER FOR SCREENING FOR DIABETES MELLITUS: ICD-10-CM

## 2024-07-29 DIAGNOSIS — Z11.4 SCREENING FOR HIV (HUMAN IMMUNODEFICIENCY VIRUS): ICD-10-CM

## 2024-07-29 DIAGNOSIS — Z12.4 CERVICAL CANCER SCREENING: ICD-10-CM

## 2024-07-29 LAB
ANION GAP SERPL CALCULATED.3IONS-SCNC: 8 MMOL/L (ref 7–15)
BUN SERPL-MCNC: 15.5 MG/DL (ref 6–20)
CALCIUM SERPL-MCNC: 8.6 MG/DL (ref 8.8–10.4)
CHLORIDE SERPL-SCNC: 107 MMOL/L (ref 98–107)
CREAT SERPL-MCNC: 0.57 MG/DL (ref 0.51–0.95)
EGFRCR SERPLBLD CKD-EPI 2021: >90 ML/MIN/1.73M2
ERYTHROCYTE [DISTWIDTH] IN BLOOD BY AUTOMATED COUNT: 12.2 % (ref 10–15)
GLUCOSE SERPL-MCNC: 72 MG/DL (ref 70–99)
HCO3 SERPL-SCNC: 28 MMOL/L (ref 22–29)
HCT VFR BLD AUTO: 39.9 % (ref 35–47)
HCV AB SERPL QL IA: NONREACTIVE
HGB BLD-MCNC: 13 G/DL (ref 11.7–15.7)
HIV 1+2 AB+HIV1 P24 AG SERPL QL IA: NONREACTIVE
MCH RBC QN AUTO: 29.4 PG (ref 26.5–33)
MCHC RBC AUTO-ENTMCNC: 32.6 G/DL (ref 31.5–36.5)
MCV RBC AUTO: 90 FL (ref 78–100)
PLATELET # BLD AUTO: 230 10E3/UL (ref 150–450)
POTASSIUM SERPL-SCNC: 4.2 MMOL/L (ref 3.4–5.3)
RBC # BLD AUTO: 4.42 10E6/UL (ref 3.8–5.2)
SODIUM SERPL-SCNC: 143 MMOL/L (ref 135–145)
WBC # BLD AUTO: 3.4 10E3/UL (ref 4–11)

## 2024-07-29 PROCEDURE — 90471 IMMUNIZATION ADMIN: CPT | Performed by: PHYSICIAN ASSISTANT

## 2024-07-29 PROCEDURE — 85027 COMPLETE CBC AUTOMATED: CPT | Performed by: PHYSICIAN ASSISTANT

## 2024-07-29 PROCEDURE — 36415 COLL VENOUS BLD VENIPUNCTURE: CPT | Performed by: PHYSICIAN ASSISTANT

## 2024-07-29 PROCEDURE — 80048 BASIC METABOLIC PNL TOTAL CA: CPT | Performed by: PHYSICIAN ASSISTANT

## 2024-07-29 PROCEDURE — 99395 PREV VISIT EST AGE 18-39: CPT | Mod: 25 | Performed by: PHYSICIAN ASSISTANT

## 2024-07-29 PROCEDURE — 90746 HEPB VACCINE 3 DOSE ADULT IM: CPT | Performed by: PHYSICIAN ASSISTANT

## 2024-07-29 PROCEDURE — 86803 HEPATITIS C AB TEST: CPT | Performed by: PHYSICIAN ASSISTANT

## 2024-07-29 PROCEDURE — 87389 HIV-1 AG W/HIV-1&-2 AB AG IA: CPT | Performed by: PHYSICIAN ASSISTANT

## 2024-07-29 ASSESSMENT — PAIN SCALES - GENERAL: PAINLEVEL: NO PAIN (0)

## 2024-07-29 NOTE — PATIENT INSTRUCTIONS
At [facility], we strive to deliver an exceptional experience to you, every time we see you. If you receive a survey, please let us know what we are doing well and/or what we could improve upon, as we do value your feedback.  If you have MyChart, you can expect to receive results automatically within 24 hours of their completion.  Your provider will send a note interpreting your results as well.   If you do not have MyChart, you should receive your results in about a week by mail.    Your care team:                            Family Medicine Internal Medicine   MD Colten Jansen, MD Natalie Prado, MD Tyree Keith, MD Josefa Samson, PALoniC    Harpreet Rosado, MD Pediatrics   Sari Marte, MD Nelsy Hand, MD Esperanza Malin, APRN CNP Jocelynn Yancey APRN CNP   Carlos Rothman, MD Jeanette Nolan, MD Courtney Johns, CNP     Fracisco Campo, CNP Same-Day Provider (No follow-up visits)   DARIELA Martino, DNP Britt Sánchez, DARIELA Ornelas, FNP, BC BRITTA GannC     Clinic hours: Monday - Thursday 7 am-6 pm; Fridays 7 am-5 pm.   Urgent care: Monday - Friday 10 am- 8 pm; Saturday and Sunday 9 am-5 pm.    Clinic: [phone]       [facility]: Monday - Thursday 8 am - 7 pm; Friday 8 am - 6 pm  [facility]: [phone]    Patient Education   L?i Khuyên Son Sóc D? Phòng  Ðây là l?i khuyên turner chúng tôi thu?ng chester ra d? giúp m?i jeffrey?i kh?e m?nh. Nhóm son sóc c?a quý v? có th? có l?i khuyên c? th? dành riyazan cho quý v?. Vui lòng trao d?i v?i nhóm son sóc v? himanshu c?u son sóc d? phòng c?a chính quý v?.  L?i s?ng  T?p th? d?c ít nh?t 150 phút m?i tu?n (30 phút m?i ngày, 5 ngày m?t tu?n).  Th?c hi?n các ho?t d?ng cardenas cu?ng co 2 ngày m?t tu?n. Ði?u này s? giúp quý v? ki?m soát cân n?ng và iron ng?a b?nh t?t.  Không hút thu?c.  Thoa michael ch?ng n?ng d? iron ng?a chika thu  "da.  Ki?m tra m?c d? radon debby nhà t? 2 d?n 5 nam m?t l?n. Radon là m?t lo?i khí không màu, không mùi có th? gây h?i cho ph?i c?a quý v?. Ð? tìm hi?u thêm, hãy truy c?p www.[facility].mn. và tìm ki?m \"Radon in Homes\" (Radon debby nhà).  Gi? súng không n?p d?n và khóa l?i d? ? diana an toàn himanshu két s?t ho?c h?m ch?a súng, ho?c s? d?ng khóa súng và c?t chìa khóa di. Luôn khóa và c?t d?n ? ch? riêng. Ð? tìm hi?u thêm, hãy truy c?p dps.[email or web address] và tìm ki?m \"safe gun storage\" (c?t gi? súng an toàn).  Sumit du?ng  An ít nh?t 5 kh?u ph?n trái cây và beatriz qu? m?i ngày.  Hãy th? bánh mì lúa mì, g?o l?t và mì ?ng nguyên h?t (thay vì bánh mì tr?ng, g?o và mì ?ng).  N?p d? canxi và vitamin D. Ki?m tra nhãn trên th?c ph?m và hu?ng t?i 100% zachary RDA (m?c tiêu th? hàng ngày du?c khuy?n ngh?).  Khám d?nh k?  Khám và v? sinh rang mi?ng 6 tháng m?t l?n.  G?p nhóm son sóc s?c kh?e c?a quý v? hàng namd? trao d?i v?:  B?t k? thay d?i nào v? s?c kh?e c?a quý v?.  B?t k? lo?i thu?c nào mà nhóm son sóc c?a quý v? dã kê don.  Son sóc d? phòng, k? ho?ch hóa stevan dình và cách iron ng?a các b?nh mãn tính.  Tiêm ch?ng (v?c-kingsley)   Tiêm phòng HPV (d?n 26 tu?i), n?u quý v? daniel t?ng tiêm lo?i v?c-kingsley này brandy?c dó.  Tiêm phòng viêm norman B (d?n 59 tu?i), n?u quý v? daniel t?ng tiêm lo?i v?c-kingsley này brandy?c dó.  Tiêm phòng COVID-19: Tiêm v?c-kingsley này khi d?n h?n.  Tiêm phòng cúm: Tiêm phòng cúm hàng nam.  Tiêm phòng u?n ván: Tiêm phòng u?n ván 10 nam m?t l?n.  Tiêm phòng ph? c?u khu?n, viêm norman A và RSV: Hãy h?i nhóm son sóc c?a quý v? xem li?u quý v? có c?n nh?ng sherin tiêm này hay không d?a trên nguy co jaqueline?m b?nh c?a quý v?.  Tiêm phòng Debi th?n kinh (dành cho tu?i t? 50 tr? lên).  Xét olga?m s?c kh?e t?ng quát  Sàng l?c b?nh ti?u du?ng:  B?t d?u t? tu?i 35, Khám sàng l?c b?nh ti?u du?ng ít nh?t 3 nam m?t l?n.  N?u quý v? du?i 35 tu?i, hãy h?i nhóm son sóc xem quý v? có nên sàng l?c b?nh ti?u du?ng hay không.  Xét olga?m cholesterol: T? tu?i 39, b?t " d?u xét olga?m cholesterol 5 nam m?t l?n, ho?c thu?ng xuyên hon n?u du?c khuy?n ngh?.  Ðo m?t d? xuong (DEXA): ? tu?i 50, hãy h?i nhóm jane sóc c?a quý v? xem quý v? có nên th?c hi?n xét olga?m này d? phát hi?n b?nh loãng xuong (xuong giòn) hay không.  Viêm norman C: Ði xét ogla?m ít nh?t m?t l?n michael d?i.  Khám sàng l?c phình d?ng m?ch ch? b?ng: Trao d?i v?i bác si c?a quý v? v? vi?c th?c hi?n sàng l?c này n?u quý v?:  Ðã t?ng hút thu?c; và  Là nam gi?i v? m?t sinh h?c; và  Michael d? tu?i t? 65 d?n 75.  STI (b?nh jaqueline?m trùng jagruti du?ng tình d?c)  Derek?c 24 tu?i: Hãy h?i nhóm jane sóc c?a quý v? xem quý v? có nên khám sàng l?c STI hay không.  Noni 24 tu?i: Sàng l?c STI n?u quý v? có nguy co m?c b?nh. Quý v? có nguy co m?c các b?nh STI (janis g?m c? HIV) n?u:  Quý v? có neena h? tình d?c tich c?c v?i hon m?t jeffrey?i.  Quý v? không s? d?ng janis paz guallpa m?i lúc.  Quý v? ho?c b?n tình du?c ch?n doán m?c b?nh jaqueline?m b?nh lây jagruti du?ng tình d?c.  N?u quý v? có nguy co jaqueline?m HIV, hãy h?i venkat thu?c PrEP d? iron ng?a HIV.  Ði xét olga?m HIV ít nh?t m?t l?n michael d?i, cho dù quý v? có nguy co jaqueline?m HIV hay không.  Xét olga?m sàng l?c chika thu  Sàng l?c chika thu c? t? cung: N?u quý v? có c? t? cung, hãy b?t d?u làm xét olga?m sàng l?c chika thu c? t? cung thu?ng xuyên t? tu?i 21. H?u h?t nh?ng jeffrey?i khám sàng l?c thu?ng xuyên v?i k?t qu? bình thu?ng d?u có th? ng?ng khám noni 65 tu?i. Hãy trao d?i v? v?n d? này v?i bác si c?a quý v?.  Quét chika thu vú (ch?p radha rubén?n vú): N?u quý v? có hay t?ng có vú, hãy b?t d?u ch?p radha rubén?n vú thu?ng xuyên b?t d?u t? tu?i 40. Ðây là quá trình quét d? ki?m tra chika thu vú.  Sàng l?c chika thu d?i tràng: C?n b?t d?u sàng l?c chika thu d?i tràng t? tu?i 45.  Th?c hi?n xét olga?m n?i soi d?i tràng 10 nam m?t l?n (ho?c thu?ng xuyên hon n?u quý v? có nguy co m?c b?nh) Ho?c, hãy h?i nhà cung c?p c?a quý v? v? các xét olga?m phân himanshu xét olga?m FIT hàng nam ho?c xét olga?m Colog[employer] 3 nam m?t l?n.  Ð? tìm hi?u thêm v?  "các tùy ch?n th? olga?m c?a quý v?, hãy truy c?p: [email or web address].  Ð? du?c h? tr? chester ra ian?t d?nh, hãy truy c?p: bit.ly/jy38112.  Xét olga?m sàng l?c chika thu rubén?n ti?n li?t: N?u quý v? có rubén?n ti?n li?t và ? d? tu?i t? 55 d?n 69, hãy h?i bác si xem vi?c xét olga?m sàng l?c chika thu rubén?n ti?n li?t hàng nam có dem l?i l?i ích gì cho quý v? hay không.  Sàng l?c chika thu ph?i: N?u quý v? dã t?ng ho?c còn bedoya hút thu?c và t? 50 d?n 80 tu?i, hãy h?i nhóm son sóc c?a quý v? xem vi?c sàng l?c chika thu ph?i thu?ng xuyên có phù h?p v?i quý v? hay không.    Ch? nh?m m?c dích monik kh?o. Không th? thay th? l?i khuyên c?a nhà cung c?p d?ch v? son sóc s?c kh?e c?a quý v?. B?n ina?n   2023 [facility].   B?o patty m?i ian?n. Ðu?c dánh giá lâm sàng b?i [facility]. [employer] [id number] - REV 04/24.     Patient Education   L?i Khuyên Son Sóc D? Phòng  Ðây là l?i cesario suarez tôi thu?ng chester ra d? giúp m?i jeffrey?i kh?e m?nh. Nhóm son sóc c?a quý v? có th? có l?i khuyên c? th? dành riêng cho quý v?. Vui lòng trao d?i v?i nhóm son sóc v? himanshu c?u son sóc d? phòng c?a chính quý v?.  L?i s?ng  T?p th? d?c ít nh?t 150 phút m?i tu?n (30 phút m?i ngày, 5 ngày m?t tu?n).  Th?c hi?n các ho?t d?ng cardenas cu?ng co 2 ngày m?t tu?n. Ði?u này s? giúp quý v? ki?m soát cân n?ng và iron ng?a b?nh t?t.  Không hút thu?c.  Thoa michael ch?ng n?ng d? iron ng?a chika thu da.  Ki?m tra m?c d? radon debby nhà t? 2 d?n 5 nam m?t l?n. Radon là m?t lo?i khí không màu, không mùi có th? gây h?i cho ph?i c?a quý v?. Ð? tìm hi?u thêm, hãy truy c?p www.[facility].mn. và tìm ki?m \"Radon in Homes\" (Radon debby nhà).  Gi? súng không n?p d?n và khóa l?i d? ? diana an toàn himanshu két s?t ho?c h?m ch?a súng, ho?c s? d?ng khóa súng và c?t chìa khóa di. Luôn khóa và c?t d?n ? ch? riêng. Ð? tìm hi?u thêm, hãy truy c?p dps.mn.gov và tìm ki?m \"safe gun storage\" (c?t gi? súng an toàn).  Sumit du?ng  An ít nh?t 5 kh?u ph?n trái " cây và beatriz qu? m?i ngày.  Hãy th? bánh mì lúa mì, g?o l?t và mì ?ng nguyên h?t (thay vì bánh mì tr?ng, g?o và mì ?ng).  N?p d? canxi và vitamin D. Ki?m tra nhãn trên th?c ph?m và hu?ng t?i 100% zachary RDA (m?c tiêu th? hàng ngày du?c khuy?n ngh?).  Khám d?nh k?  Khám và v? sinh rang mi?ng 6 tháng m?t l?n.  G?p nhóm son sóc s?c kh?e c?a quý v? hàng namd? trao d?i v?:  B?t k? thay d?i nào v? s?c kh?e c?a quý v?.  B?t k? lo?i thu?c nào mà nhóm son sóc c?a quý v? dã kê don.  Son sóc d? phòng, k? ho?ch hóa stevan dình và cách iron ng?a các b?nh mãn tính.  Tiêm ch?ng (v?c-kingsley)   Tiêm phòng HPV (d?n 26 tu?i), n?u quý v? daniel t?ng tiêm lo?i v?c-kingsley này brandy?c dó.  Tiêm phòng viêm norman B (d?n 59 tu?i), n?u quý v? daniel t?ng tiêm lo?i v?c-kingsley này brandy?c dó.  Tiêm phòng COVID-19: Tiêm v?c-kingsley này khi d?n h?n.  Tiêm phòng cúm: Tiêm phòng cúm hàng nam.  Tiêm phòng u?n ván: Tiêm phòng u?n ván 10 nam m?t l?n.  Tiêm phòng ph? c?u khu?n, viêm norman A và RSV: Hãy h?i nhóm son sóc c?a quý v? xem li?u quý v? có c?n nh?ng sherin tiêm này hay không d?a trên nguy co jaqueline?m b?nh c?a quý v?.  Tiêm phòng Debi th?n kinh (dành cho tu?i t? 50 tr? lên).  Xét olga?m s?c kh?e t?ng quát  Sàng l?c b?nh ti?u du?ng:  B?t d?u t? tu?i 35, Khám sàng l?c b?nh ti?u du?ng ít nh?t 3 nam m?t l?n.  N?u quý v? du?i 35 tu?i, hãy h?i nhóm son sóc xem quý v? có nên sàng l?c b?nh ti?u du?ng hay không.  Xét olga?m cholesterol: T? tu?i 39, b?t d?u xét olga?m cholesterol 5 nam m?t l?n, ho?c thu?ng xuyên hon n?u du?c khuy?n ngh?.  Ðo m?t d? xuong (DEXA): ? tu?i 50, hãy h?i nhóm son sóc c?a quý v? xem quý v? có nên th?c hi?n xét olga?m này d? phát hi?n b?nh loãng xuong (xuong giòn) hay không.  Viêm norman C: Ði xét olga?m ít nh?t m?t l?n debby d?i.  Khám sàng l?c phình d?ng m?ch ch? b?ng: Trao d?i v?i bác si c?a quý v? v? vi?c th?c hi?n sàng l?c này n?u quý v?:  Ðã t?ng hút thu?c; và  Là nam gi?i v? m?t sinh h?c; và  Debby d? tu?i t? 65 d?n 75.  STI (b?nh jaqueline?m trùng jagruti du?ng tìn  d?c)  Derek?c 24 tu?i: Hãy h?i nhóm jane sóc c?a quý v? xem quý v? có nên khám sàng l?c STI hay không.  Noni 24 tu?i: Sàng l?c STI n?u quý v? có nguy co m?c b?nh. Quý v? có nguy co m?c các b?nh STI (janis g?m c? HIV) n?u:  Quý v? có neena h? tình d?c tich c?c v?i hon m?t jeffrey?i.  Quý v? không s? d?ng janis paz guallpa m?i lúc.  Quý v? ho?c b?n tình du?c ch?n doán m?c b?nh jaqueline?m b?nh lây jagruti du?ng tình d?c.  N?u quý v? có nguy co jaqueline?m HIV, hãy h?i venkat thu?c PrEP d? iron ng?a HIV.  Ði xét olga?m HIV ít nh?t m?t l?n debby d?i, cho dù quý v? có nguy co jaqueline?m HIV hay không.  Xét olga?m sàng l?c chika thu  Sàng l?c chika thu c? t? cung: N?u quý v? có c? t? cung, hãy b?t d?u làm xét olga?m sàng l?c chika thu c? t? cung thu?ng xuyên t? tu?i 21. H?u h?t nh?ng jeffrey?i khám sàng l?c thu?ng xuyên v?i k?t qu? bình thu?ng d?u có th? ng?ng khám noni 65 tu?i. Hãy trao d?i v? v?n d? này v?i bác si c?a quý v?.  Quét chika thu vú (ch?p radha rubén?n vú): N?u quý v? có hay t?ng có vú, hãy b?t d?u ch?p radha rubén?n vú thu?ng xuyên b?t d?u t? tu?i 40. Ðây là quá trình quét d? ki?m tra chika thu vú.  Sàng l?c chika thu d?i tràng: C?n b?t d?u sàng l?c chika thu d?i tràng t? tu?i 45.  Th?c hi?n xét olga?m n?i soi d?i tràng 10 nam m?t l?n (ho?c thu?ng xuyên hon n?u quý v? có nguy co m?c b?nh) Ho?c, hãy h?i nhà cung c?p c?a quý v? v? các xét olga?m phân himanshu xét olga?m [facility] hàng nam ho?c xét olga?m [employer] 3 nam m?t l?n.  Ð? tìm hi?u thêm v? các tùy ch?n th? olga?m c?a quý v?, hãy truy c?p: [email or web address].  Ð? du?c h? tr? chester ra ian?t d?nh, hãy truy c?p: [email or web address].  Xét olga?m sàng l?c chika thu rubén?n ti?n li?t: N?u quý v? có rubén?n ti?n li?t và ? d? tu?i t? 55 d?n 69, hãy h?i bác si xem vi?c xét olga?m sàng l?c chika thu rubén?n ti?n li?t hàng nam có dem l?i l?i ích gì cho quý v? hay không.  Sàng l?c chika thu ph?i: N?u quý v? dã t?ng ho?c còn bedoya hút thu?c và t? 50 d?n 80 tu?i, hãy h?i nhóm jane sóc c?a quý v? xem vi?c sàng l?c chika thu ph?i thu?ng xuyên có phù  h?p v?i quý v? hay không.    Ch? nh?m m?c dích monik kh?o. Không th? thay th? l?i khuyên c?a nhà cung c?p d?ch v? jane sóc s?c kh?e c?a quý v?. B?n ian?n   2023 [facility].   B?o patty m?i ian?n. Ðu?c dánh giá lâm sàng b?i [facility]. [employer] [id number] - REV 04/24.

## 2024-07-29 NOTE — PROGRESS NOTES
Preventive Care Visit  [facility] ALIE Samson PA-C, Family Medicine  Jul 29, 2024      Assessment & Plan   Problem List Items Addressed This Visit    None  Visit Diagnoses       Routine general medical examination at a health care facility    -  Primary    Relevant Orders    Basic metabolic panel  (Ca, Cl, CO2, Creat, Gluc, K, Na, BUN)    Screening for HIV (human immunodeficiency virus)        Relevant Orders    HIV Antigen Antibody Combo    Need for hepatitis C screening test        Relevant Orders    Hepatitis C Screen Reflex to HCV RNA Quant and Genotype    Encounter for screening for diabetes mellitus        Cervical cancer screening        Iron deficiency anemia secondary to inadequate dietary iron intake        Relevant Orders    CBC with platelets (Completed)    Vasovagal reaction               Hydrate better daily and use valsalva maneuver if feels lightheaded when get up     Patient has been advised of split billing requirements and indicates understanding: Yes       Counseling  Appropriate preventive services were addressed with this patient via screening, questionnaire, or discussion as appropriate for fall prevention, nutrition, physical activity, Tobacco-use cessation, weight loss and cognition.  Checklist reviewing preventive services available has been given to the patient.  Reviewed patient's diet, addressing concerns and/or questions.   She is at risk for lack of exercise and has been provided with information to increase physical activity for the benefit of her well-being.   The patient was instructed to see the dentist every 6 months.   She is at risk for psychosocial distress and has been provided with information to reduce risk.           Hannah Baxter is a 34 year old, presenting for the following:  Physical        7/29/2024     9:10 AM   Additional Questions   Roomed by jorgito   Accompanied by none         7/29/2024     9:10 AM   Patient Reported  Additional Medications   Patient reports taking the following new medications none        Health Care Directive  Patient does not have a Health Care Directive or Living Will: Discussed advance care planning with patient; however, patient declined at this time.    HPI      Occasionally gets lightheaded when gest up from seated position, concerned if related to low hemoglobin. No passing out, no vertigo.         7/24/2024   General Health   How would you rate your overall physical health? Good   Feel stress (tense, anxious, or unable to sleep) Only a little      (!) STRESS CONCERN      7/24/2024   Nutrition   Three or more servings of calcium each day? (!) NO   Diet: Regular (no restrictions)   How many servings of fruit and vegetables per day? (!) 0-1   How many sweetened beverages each day? 0-1            7/24/2024   Exercise   Days per week of moderate/strenous exercise 1 day   Average minutes spent exercising at this level 10 min      (!) EXERCISE CONCERN      7/24/2024   Social Factors   Frequency of gathering with friends or relatives Once a week   Worry food won't last until get money to buy more No   Food not last or not have enough money for food? No   Do you have housing? (Housing is defined as stable permanent housing and does not include staying ouside in a car, in a tent, in an abandoned building, in an overnight shelter, or couch-surfing.) Yes   Are you worried about losing your housing? No   Lack of transportation? No   Unable to get utilities (heat,electricity)? No            7/24/2024   Dental   Dentist two times every year? (!) NO            7/24/2024   TB Screening   Were you born outside of the [place]? Yes            Today's PHQ-2 Score:       7/29/2024     9:02 AM   PHQ-2 ( 1999 Pfizer)   Q1: Little interest or pleasure in doing things 1   Q2: Feeling down, depressed or hopeless 1   PHQ-2 Score 2   Q1: Little interest or pleasure in doing things Several days   Q2: Feeling down, depressed or hopeless  Several days   PHQ-2 Score 2           7/24/2024   Substance Use   Alcohol more than 3/day or more than 7/wk No   Do you use any other substances recreationally? No        Social History     Tobacco Use    Smoking status: Never    Smokeless tobacco: Never   Substance Use Topics    Alcohol use: No     Alcohol/week: 0.0 standard drinks of alcohol    Drug use: No          Mammogram Screening - Patient under 40 years of age: Routine Mammogram Screening not recommended.           7/24/2024   One time HIV Screening   Previous HIV test? I don't know          7/24/2024   STI Screening   New sexual partner(s) since last STI/HIV test? No        History of abnormal Pap smear: No - age 30- 64 PAP with HPV every 5 years recommended  Last PAP and HPV 12/7/24 12/29/2016    12:00 AM   PAP / HPV   PAP (Historical) NIL            7/24/2024   Contraception/Family Planning   Questions about contraception or family planning No           Reviewed and updated as needed this visit by Provider   Tobacco  Allergies  Meds  Problems  Med Hx  Surg Hx  Fam Hx            Patient Active Problem List   Diagnosis    Routine history and physical examination of adult    Immigrant with language difficulty     Past Surgical History:   Procedure Laterality Date    NO HISTORY OF SURGERY         Social History     Tobacco Use    Smoking status: Never    Smokeless tobacco: Never   Substance Use Topics    Alcohol use: No     Alcohol/week: 0.0 standard drinks of alcohol     Family History   Problem Relation Age of Onset    Family History Negative Mother     Glaucoma No family hx of     Macular Degeneration No family hx of     Retinal detachment No family hx of          Current Outpatient Medications   Medication Sig Dispense Refill    acyclovir (ZOVIRAX) 400 MG tablet Take 1 tablet (400 mg) by mouth every 8 hours for 10 days 30 tablet 0     No Known Allergies      Review of Systems  Constitutional, HEENT, cardiovascular, pulmonary, gi and gu  "systems are negative, except as otherwise noted.     Objective    Exam  BP 95/60   Pulse 89   Temp 97.2  F (36.2  C) (Temporal)   Resp 20   Ht 1.472 m (4' 9.95\")   Wt 48.1 kg (106 lb)   SpO2 98%   BMI 22.19 kg/m     Estimated body mass index is 22.19 kg/m  as calculated from the following:    Height as of this encounter: 1.472 m (4' 9.95\").    Weight as of this encounter: 48.1 kg (106 lb).      Physical Exam  GENERAL: alert and no distress  EYES: Eyes grossly normal to inspection, PERRL and conjunctivae and sclerae normal  HENT: ear canals and TM's normal, nose and mouth without ulcers or lesions  NECK: no adenopathy, no asymmetry, masses, or scars  RESP: lungs clear to auscultation - no rales, rhonchi or wheezes  BREAST: normal without masses, tenderness or nipple discharge and no palpable axillary masses or adenopathy  CV: regular rate and rhythm, normal S1 S2, no S3 or S4, no murmur, click or rub, no peripheral edema  ABDOMEN: soft, nontender, no hepatosplenomegaly, no masses and bowel sounds normal  MS: no gross musculoskeletal defects noted, no edema  SKIN: no suspicious lesions or rashes  NEURO: Normal strength and tone, mentation intact and speech normal  PSYCH: mentation appears normal, affect normal/bright    Diagnostic Test Results:  Results for orders placed or performed in visit on 07/29/24 (from the past 24 hour(s))   CBC with platelets   Result Value Ref Range    WBC Count 3.4 (L) 4.0 - 11.0 10e3/uL    RBC Count 4.42 3.80 - 5.20 10e6/uL    Hemoglobin 13.0 11.7 - 15.7 g/dL    Hematocrit 39.9 35.0 - 47.0 %    MCV 90 78 - 100 fL    MCH 29.4 26.5 - 33.0 pg    MCHC 32.6 31.5 - 36.5 g/dL    RDW 12.2 10.0 - 15.0 %    Platelet Count 230 150 - 450 10e3/uL         Signed Electronically by: Stephanie Samson PA-C    "

## 2024-07-29 NOTE — PROGRESS NOTES
Prior to immunization administration, verified patients identity using patient s name and date of birth. Please see Immunization Activity for additional information.     Screening Questionnaire for Adult Immunization    Are you sick today?   No   Do you have allergies to medications, food, a vaccine component or latex?   No   Have you ever had a serious reaction after receiving a vaccination?   No   Do you have a long-term health problem with heart, lung, kidney, or metabolic disease (e.g., diabetes), asthma, a blood disorder, no spleen, complement component deficiency, a cochlear implant, or a spinal fluid leak?  Are you on long-term aspirin therapy?   No   Do you have cancer, leukemia, HIV/AIDS, or any other immune system problem?   No   Do you have a parent, brother, or sister with an immune system problem?   No   In the past 3 months, have you taken medications that affect  your immune system, such as prednisone, other steroids, or anticancer drugs; drugs for the treatment of rheumatoid arthritis, Crohn s disease, or psoriasis; or have you had radiation treatments?   No   Have you had a seizure, or a brain or other nervous system problem?   No   During the past year, have you received a transfusion of blood or blood    products, or been given immune (gamma) globulin or antiviral drug?   No   For women: Are you pregnant or is there a chance you could become       pregnant during the next month?   No   Have you received any vaccinations in the past 4 weeks?   No     Immunization questionnaire answers were all negative.      Patient instructed to remain in clinic for 15 minutes afterwards, and to report any adverse reactions.     Screening performed by Charisse Toure MA on 7/29/2024 at 9:51 AM.

## 2024-09-30 ENCOUNTER — OFFICE VISIT (OUTPATIENT)
Dept: OPTOMETRY | Facility: CLINIC | Age: 34
End: 2024-09-30
Payer: COMMERCIAL

## 2024-09-30 DIAGNOSIS — Z01.00 EXAMINATION OF EYES AND VISION: Primary | ICD-10-CM

## 2024-09-30 DIAGNOSIS — H52.13 MYOPIA OF BOTH EYES: ICD-10-CM

## 2024-09-30 PROCEDURE — 92015 DETERMINE REFRACTIVE STATE: CPT | Performed by: OPTOMETRIST

## 2024-09-30 PROCEDURE — 92004 COMPRE OPH EXAM NEW PT 1/>: CPT | Performed by: OPTOMETRIST

## 2024-09-30 ASSESSMENT — VISUAL ACUITY
METHOD: SNELLEN - LINEAR
OS_SC+: -1
OD_SC: 20/100
OS_SC: 20/20
OD_SC: 20/20
OS_SC: 20/80

## 2024-09-30 ASSESSMENT — TONOMETRY
IOP_METHOD: TONOPEN
OD_IOP_MMHG: 17
OS_IOP_MMHG: 17

## 2024-09-30 ASSESSMENT — REFRACTION_WEARINGRX
OD_SPHERE: -2.25
OS_SPHERE: -2.00
SPECS_TYPE: SVL

## 2024-09-30 ASSESSMENT — SLIT LAMP EXAM - LIDS
COMMENTS: NORMAL
COMMENTS: NORMAL

## 2024-09-30 ASSESSMENT — EXTERNAL EXAM - LEFT EYE: OS_EXAM: NORMAL

## 2024-09-30 ASSESSMENT — CONF VISUAL FIELD
OS_NORMAL: 1
OD_INFERIOR_NASAL_RESTRICTION: 0
OS_INFERIOR_TEMPORAL_RESTRICTION: 0
OS_INFERIOR_NASAL_RESTRICTION: 0
OS_SUPERIOR_TEMPORAL_RESTRICTION: 0
OD_SUPERIOR_NASAL_RESTRICTION: 0
OD_SUPERIOR_TEMPORAL_RESTRICTION: 0
OS_SUPERIOR_NASAL_RESTRICTION: 0
OD_INFERIOR_TEMPORAL_RESTRICTION: 0
OD_NORMAL: 1

## 2024-09-30 ASSESSMENT — CUP TO DISC RATIO
OD_RATIO: 0.5
OS_RATIO: 0.5

## 2024-09-30 ASSESSMENT — REFRACTION_MANIFEST
OD_SPHERE: -2.25
OS_SPHERE: -2.00

## 2024-09-30 ASSESSMENT — EXTERNAL EXAM - RIGHT EYE: OD_EXAM: NORMAL

## 2024-09-30 NOTE — LETTER
9/30/2024      Sahil Pimentel  [address] [address]      Dear Colleague,    Thank you for referring your patient, Sahil Pimentel, to the [facility]. Please see a copy of my visit note below.    Chief Complaint   Patient presents with     COMPREHENSIVE EYE EXAM         Last Eye Exam: 12/2/2016  Dilated Previously: No, side effects of dilation explained today    What are you currently using to see?  Glasses, she left them in the car, she only wears them for driving       Distance Vision Acuity: Noticed gradual change in both eyes    Near Vision Acuity: Satisfied with vision while reading  unaided    Eye Comfort: good  Do you use eye drops? : No  Occupation or Hobbies: nail tech    [facility]viina [facility]At[facility]  Optometric Assistant, [facility]        Medical, surgical and family histories reviewed and updated 9/30/2024.       OBJECTIVE: See Ophthalmology exam    ASSESSMENT:    ICD-10-CM    1. Examination of eyes and vision  Z01.00       2. Myopia of both eyes  H52.13           PLAN:     Patient Instructions   Eyeglass prescription given.    Return in 1 year for a complete eye exam or sooner if needed.    Howard Estrada, TEX         Again, thank you for allowing me to participate in the care of your patient.        Sincerely,        Howard Estrada, OD

## 2024-09-30 NOTE — PROGRESS NOTES
Chief Complaint   Patient presents with    COMPREHENSIVE EYE EXAM         Last Eye Exam: 12/2/2016  Dilated Previously: No, side effects of dilation explained today    What are you currently using to see?  Glasses, she left them in the car, she only wears them for driving       Distance Vision Acuity: Noticed gradual change in both eyes    Near Vision Acuity: Satisfied with vision while reading  unaided    Eye Comfort: good  Do you use eye drops? : No  Occupation or Hobbies: nail tech    Divina KelleyCuyuna [facility] Centerkarina  Optometric Assistant, [facility]        Medical, surgical and family histories reviewed and updated 9/30/2024.       OBJECTIVE: See Ophthalmology exam    ASSESSMENT:    ICD-10-CM    1. Examination of eyes and vision  Z01.00       2. Myopia of both eyes  H52.13           PLAN:     Patient Instructions   Eyeglass prescription given.    Return in 1 year for a complete eye exam or sooner if needed.    Howard Estrada, OD

## 2024-09-30 NOTE — PATIENT INSTRUCTIONS
Eyeglass prescription given.    Myopia (nearsightedness) is a condition where objects up close appear clearly, while objects far away appear blurry. With myopia, light comes to focus in front of the retina instead of on the retina.    Return in 1 year for a complete eye exam or sooner if needed.    Howard Estrada, TEX    The affects of the dilating drops last for 4- 6 hours.  You will be more sensitive to light and vision will be blurry up close.  Do not drive if you do not feel comfortable.  Mydriatic sunglasses were given if needed.      Optometry Providers       Clinic Locations                                 Telephone Number   Dr. Suyapa Dunham [place]    [facility]  [place] [phone]     [facility] Hours:                [place] [facility] Hours:       [place] [facility] Hours:   [address] [address]   [address] [address] [facility]     [address]  [place] [address]   [place] [address]    [place] [address]  Phone: [phone]                    Phone: [phone]     Phone: [phone]                      Monday 8:00-6:00                          Monday 8:00-6:00                          Monday 8:00-6:00              Tuesday 8:00-6:00                          Tuesday 8:00-6:00                          Tuesday 8:00-6:00              Wednesday 8:00-6:00                  Wednesday 8:00-6:00                   Wednesday 8:00-6:00      Thursday 8:00-6:00                        Thursday 8:00-6:00                         Thursday 8:00-6:00            Friday 8:00-5:00                              Friday 8:00-5:00                              Friday 8:00-5:00    [facility] Hours:   [address] [place] [address]  [phone]    Monday 9:00-6:00  Tuesday 9:00-6:00  Wednesday 9:00-6:00  Thursday 9:00-6:00  Friday  9:00-5:00  As always, Thank you for trusting us with your health care needs!

## 2024-10-11 ENCOUNTER — TELEPHONE (OUTPATIENT)
Dept: FAMILY MEDICINE | Facility: CLINIC | Age: 34
End: 2024-10-11
Payer: COMMERCIAL

## 2024-11-05 ENCOUNTER — TELEPHONE (OUTPATIENT)
Dept: FAMILY MEDICINE | Facility: CLINIC | Age: 34
End: 2024-11-05
Payer: COMMERCIAL

## 2024-11-05 NOTE — TELEPHONE ENCOUNTER
Patient Quality Outreach    Patient is due for the following:       Topic Date Due    Flu Vaccine (1) 09/01/2024    COVID-19 Vaccine (4 - 2024-25 season) 09/01/2024       Next Steps:   Schedule a nurse only visit for shot     Type of outreach:    Sent Add[employer] message.      Questions for provider review:    None           Suma Amador MA

## 2025-07-30 ENCOUNTER — APPOINTMENT (OUTPATIENT)
Dept: INTERPRETER SERVICES | Facility: CLINIC | Age: 35
End: 2025-07-30
Payer: COMMERCIAL

## 2025-07-31 ENCOUNTER — OFFICE VISIT (OUTPATIENT)
Dept: FAMILY MEDICINE | Facility: CLINIC | Age: 35
End: 2025-07-31
Attending: PHYSICIAN ASSISTANT
Payer: COMMERCIAL

## 2025-07-31 VITALS
TEMPERATURE: 97.8 F | BODY MASS INDEX: 21.36 KG/M2 | SYSTOLIC BLOOD PRESSURE: 103 MMHG | OXYGEN SATURATION: 99 % | RESPIRATION RATE: 17 BRPM | HEART RATE: 81 BPM | HEIGHT: 57 IN | WEIGHT: 99 LBS | DIASTOLIC BLOOD PRESSURE: 63 MMHG

## 2025-07-31 DIAGNOSIS — Z00.00 ROUTINE GENERAL MEDICAL EXAMINATION AT A HEALTH CARE FACILITY: Primary | ICD-10-CM

## 2025-07-31 DIAGNOSIS — R42 DIZZINESS: ICD-10-CM

## 2025-07-31 DIAGNOSIS — Z23 NEED FOR VACCINATION: ICD-10-CM

## 2025-07-31 DIAGNOSIS — Z13.220 SCREENING FOR LIPID DISORDERS: ICD-10-CM

## 2025-07-31 DIAGNOSIS — Z13.1 SCREENING FOR DIABETES MELLITUS: ICD-10-CM

## 2025-07-31 LAB
CHOLEST SERPL-MCNC: 135 MG/DL
ERYTHROCYTE [DISTWIDTH] IN BLOOD BY AUTOMATED COUNT: 12 % (ref 10–15)
FASTING STATUS PATIENT QL REPORTED: YES
FASTING STATUS PATIENT QL REPORTED: YES
GLUCOSE SERPL-MCNC: 98 MG/DL (ref 70–99)
HCT VFR BLD AUTO: 40.1 % (ref 35–47)
HDLC SERPL-MCNC: 44 MG/DL
HGB BLD-MCNC: 13.4 G/DL (ref 11.7–15.7)
LDLC SERPL CALC-MCNC: 81 MG/DL
MCH RBC QN AUTO: 29.3 PG (ref 26.5–33)
MCHC RBC AUTO-ENTMCNC: 33.4 G/DL (ref 31.5–36.5)
MCV RBC AUTO: 88 FL (ref 78–100)
NONHDLC SERPL-MCNC: 91 MG/DL
PLATELET # BLD AUTO: 228 10E3/UL (ref 150–450)
RBC # BLD AUTO: 4.57 10E6/UL (ref 3.8–5.2)
TRIGL SERPL-MCNC: 48 MG/DL
WBC # BLD AUTO: 3.4 10E3/UL (ref 4–11)

## 2025-07-31 SDOH — HEALTH STABILITY: PHYSICAL HEALTH: ON AVERAGE, HOW MANY DAYS PER WEEK DO YOU ENGAGE IN MODERATE TO STRENUOUS EXERCISE (LIKE A BRISK WALK)?: 0 DAYS

## 2025-07-31 ASSESSMENT — SOCIAL DETERMINANTS OF HEALTH (SDOH): HOW OFTEN DO YOU GET TOGETHER WITH FRIENDS OR RELATIVES?: ONCE A WEEK

## 2025-07-31 ASSESSMENT — PAIN SCALES - GENERAL: PAINLEVEL_OUTOF10: NO PAIN (0)

## 2025-07-31 NOTE — PATIENT INSTRUCTIONS
"Patient Education   L?i Khuyên Ch?m Sóc D? Phòng  ?ây là l?i cesario suarez tôi th??ng ??a ra ?? giúp m?i ng??i kh?e m?nh. Nhóm ch?m sóc c?a quý v? có th? có l?i khuyên c? th? dành riêng cho quý v?. Vui lòng trao ??i v?i nhóm ch?m sóc v? himanshu c?u ch?m sóc d? phòng c?a chính quý v?.  L?i s?ng  T?p th? d?c ít nh?t 150 phút m?i tu?n (30 phút m?i ngày, 5 ngày m?t tu?n).  Th?c hi?n các ho?t ??ng t?ng c??ng c? 2 ngày m?t tu?n. ?i?u này s? giúp quý v? ki?m soát cân n?ng và ng?n ng?a b?nh t?t.  Không hút thu?c.  Thoa michael ch?ng n?ng ?? ng?n ng?a chika th? da.  Dành th?i andrew ? bên stevan ?ình và b?n bè.  Ki?m tra m?c ?? radon debby nhà t? 2 ??n 5 n?m m?t l?n. Radon là m?t lo?i khí không màu, không mùi có th? gây h?i cho ph?i c?a quý v?. ?? tìm hi?u thêm, hãy truy c?p www.[facility].[place]. và tìm ki?m \"Radon in Homes\" (Radon debby nhà).  Gi? súng không n?p ??n và khóa l?i ?? ? n?i an toàn nh? két s?t ho?c h?m ch?a súng, ho?c s? d?ng khóa súng và c?t chìa khóa ?i. Luôn khóa và c?t ??n ? ch? riêng. ?? tìm hi?u thêm, hãy truy c?p [email or web address] và tìm ki?m \"safe gun storage\" (c?t gi? súng an toàn).  Sumit d??ng  ?n ít nh?t 5 kh?u ph?n trái cây và beatriz qu? m?i ngày.  Hãy th? bánh mì lúa mì, g?o l?t và mì ?ng nguyên h?t (thay vì bánh mì tr?ng, g?o và mì ?ng).  N?p ?? canxi và vitamin D. Ki?m tra nhãn trên th?c ph?m và h??ng t?i 100% zachary RDA (m?c tiêu th? hàng ngày ???c khuy?n ngh?).  Khám ??nh k?  Khám và v? sinh r?ng mi?ng 6 tháng m?t l?n.  Ng??i paz tu?i: H?i nhóm ch?m sóc c?a quý v? v? t?n guallpa?t ki?m tra trí nh?.  G?p nhóm ch?m sóc s?c kh?e c?a quý v? hàng n?m?? trao ??i v?:  B?t k? thay ??i nào v? s?c kh?e c?a quý v?.  B?t k? lo?i thu?c nào mà nhóm ch?m sóc c?a quý v? ?ã kê ??n.  Ch?m sóc d? phòng, k? ho?ch hóa stevan ?ình và cách ng?n ng?a các b?nh mãn tính.  Tiêm ch?ng (v?c-kingsley)   Tiêm phòng HPV (??n 26 tu?i), n?u quý v? ch?a t?ng tiêm lo?i v?c-kingsley này tr??c ?ó.  Tiêm phòng viêm norman B (??n 59 tu?i), n?u quý v? ch?a t?ng tiêm lo?i " v?c-kingsley này tr??c ?ó.  Tiêm phòng COVID-19: Tiêm v?c-kingsley này khi ??n h?n.  Tiêm phòng cúm: Tiêm phòng cúm hàng n?m.  Tiêm phòng u?n ván: Tiêm phòng u?n ván 10 n?m m?t l?n.  Tiêm phòng ph? c?u khu?n, viêm norman A và RSV: Hãy h?i nhóm ch?m sóc c?a quý v? xem li?u quý v? có c?n nh?ng m?i tiêm này hay không d?a trên nguy c? jaqueline?m b?nh c?a quý v?.  Tiêm phòng Debi th?n kinh (dành cho tu?i t? 50 tr? lên).  Xét olga?m s?c kh?e t?ng quát  Sàng l?c b?nh ti?u ???ng:  B?t ??u t? tu?i 35, Khám sàng l?c b?nh ti?u ???ng ít nh?t 3 n?m m?t l?n.  N?u quý v? d??i 35 tu?i, hãy h?i nhóm ch?m sóc xem quý v? có nên sàng l?c b?nh ti?u ???ng hay không.  Xét olga?m cholesterol: T? tu?i 39, b?t ??u xét olga?m cholesterol 5 n?m m?t l?n, ho?c th??ng xuyên h?n n?u ???c khuy?n ngh?.  ?o m?t ?? x??ng (DEXA): ? tu?i 50, hãy h?i nhóm ch?m sóc c?a quý v? xem quý v? có nên th?c hi?n xét olga?m này ?? phát hi?n b?nh loãng x??ng (x??ng giòn) hay không.  Viêm norman C: ?i xét olga?m ít nh?t m?t l?n debby ??i.  Khám sàng l?c phình ??ng m?ch ch? b?ng: Trao ??i v?i bác s? c?a quý v? v? vi?c th?c hi?n sàng l?c này n?u quý v?:  ?ã t?ng hút thu?c; và  Là nam gi?i v? m?t sinh h?c; và  Debby ?? tu?i t? 65 ??n 75.  STI (b?nh jaqueline?m trùng jagruti ???ng tình d?c)  Tr??c 24 tu?i: Hãy h?i nhóm ch?m sóc c?a quý v? xem quý v? có nên khám sàng l?c STI hay không.  Noni 24 tu?i: Sàng l?c STI n?u quý v? có nguy c? m?c b?nh. Quý v? có nguy c? m?c các b?nh STI (janis g?m c? HIV) n?u:  Quý v? có neena h? tình d?c tich c?c v?i h?n m?t ng??i.  Quý v? không s? d?ng janis pza guallpa m?i lúc.  Quý v? ho?c b?n tình ???c ch?n ?oán m?c b?nh jaqueline?m b?nh lây jagruti ???ng tình d?c.  N?u quý v? có nguy c? jaqueline?m HIV, hãy h?i venkat thu?c PrEP ?? ng?n ng?a HIV.  ?i xét olga?m HIV ít nh?t m?t l?n debby ??i, cho dù quý v? có nguy c? jaqueline?m HIV hay không.  Xét olga?m sàng l?c chika th?  Sàng l?c chika th? c? t? cung: N?u quý v? có c? t? cung, hãy b?t ??u làm xét olga?m sàng l?c chika th? c? t? cung th??ng xuyên t? audi?i 21.  H?u h?t nh?ng ng??i khám sàng l?c th??ng xuyên v?i k?t qu? bình th??ng ??u có th? ng?ng khám kavin 65 tu?i. Hãy trao ??i v? v?n ?? này v?i bác s? c?a quý v?.  Quét chika th? vú (ch?p radha rubén?n vú): N?u quý v? có hay t?ng có vú, hãy b?t ??u ch?p radha rubén?n vú th??ng xuyên b?t ??u t? tu?i 40. ?ây là quá trình quét ?? ki?m tra chika th? vú.  Sàng l?c chika th? ??i tràng: C?n b?t ??u sàng l?c chika th? ??i tràng t? tu?i 45.  Th?c hi?n xét olga?m n?i soi ??i tràng 10 n?m m?t l?n (ho?c th??ng xuyên h?n n?u quý v? có nguy c? m?c b?nh) Ho?c, hãy h?i nhà cung c?p c?a quý v? v? các xét olga?m phân nh? xét olga?m FIT hàng n?m ho?c xét olga?m Cologuard 3 n?m m?t l?n.  ?? tìm hi?u thêm v? các tùy ch?n th? olga?m c?a quý v?, hãy truy c?p: [email or web address].  ?? ???c h? tr? ??a ra ian?t ??nh, hãy truy c?p: [email or web address].  Xét olga?m sàng l?c chika th? rubén?n ti?n li?t: N?u quý v? có rubén?n ti?n li?t và ? ?? tu?i t? 55 ??n 69, hãy h?i bác s? xem vi?c xét olga?m sàng l?c chika th? rubén?n ti?n li?t hàng n?m có ?em l?i l?i ích gì cho quý v? hay không.  Sàng l?c chika th? ph?i: N?u quý v? ?ã t?ng ho?c còn ?ang hút thu?c và t? 50 ??n 80 tu?i, hãy h?i nhóm ch?m sóc c?a quý v? xem vi?c sàng l?c chika th? ph?i th??ng xuyên có phù h?p v?i quý v? hay không.    Ch? nh?m m?c ?ích monik kh?o. Không th? thay th? l?i khuyên c?a nhà cung c?p d?ch v? ch?m sóc s?c kh?e c?a quý v?. B?n ian?n   2023 [facility].   B?o l?u m?i ian?n. ???c ?ánh giá lâm sàng b?i [facility] Transitions Program. Localize Direct [id number] - REV 06/25.    At [facility], we strive to deliver an exceptional experience to you, every time we see you. If you receive a survey, please let us know what we are doing well and/or what we could improve upon, as we do value your feedback.  If you have MyChart, you can expect to receive results automatically within 24 hours of their completion.  Your provider will send a note interpreting your results  as well.   If you do not have MyChart, you should receive your results in about a week by mail.    Your care team:                            Family Medicine Internal Medicine   MD Colten Jansen, MD Natalie Prado, MD Tyree Keith, MD Josefa Samson, PADARIELA Manley, JODY Rosado, MD Pediatrics   Sari Marte, MD Nelsy Hand, MD Kelle Pham, PANAKIA Yancey APRN CNP   Carols Rothman, MD Jeanette Nolan, MD Courtney Johns, CNP      Same-Day Provider (No follow-up visits)    AJAY Oglesby PA-C     Clinic hours: Monday - Thursday 7 am-6 pm; Fridays 7 am-5 pm.   Urgent care: Monday - Friday 10 am- 8 pm; Saturday and Sunday 9 am-5 pm.    Clinic: [phone]       [facility]: Monday - Thursday 8 am - 7 pm; Friday 8 am - 6 pm  [facility]: [phone]     [facility] Scheduling: Monday - Friday 7 am - 7 pm; Saturday 7 am - 3:30 pm  [phone] [place] : [phone]

## 2025-07-31 NOTE — PROGRESS NOTES
Preventive Care Visit  [facility]  Mihai Roy MD, Family Medicine  Jul 31, 2025      Assessment & Plan     (Z00.00) Routine general medical examination at a health care facility  (primary encounter diagnosis)  Comment: Negative screening exam; up-to-date on preventive services.   Plan: COVID-19 12+ (PFIZER), Lipid panel reflex to         direct LDL Non-fasting, Glucose        Return in about 1 year (around 7/31/2026) for full physical.      (R42) Dizziness  Comment: The history is vague, but I get the sense she is experiencing intermittent vertigo, sometimes accompanied by headaches.   Plan: CBC with platelets, Adult Neurology          Referral      (Z13.1) Screening for diabetes mellitus  Comment:   Plan: Glucose            (Z13.220) Screening for lipid disorders  Comment: fasting.   Plan: Lipid panel reflex to direct LDL Non-fasting            (Z23) Need for vaccination  Comment:   Plan: COVID-19 12+ (PFIZER)             Counseling  Appropriate preventive services were addressed with this patient via screening, questionnaire, or discussion as appropriate for fall prevention, nutrition, physical activity, Tobacco-use cessation, social engagement, weight loss and cognition.  Checklist reviewing preventive services available has been given to the patient.  Reviewed patient's diet, addressing concerns and/or questions.   The patient was instructed to see the dentist every 6 months.     Hannah Baxter is a 35 year old, presenting for the following:  Physical    Patient reports that she feels dizzy around 1-2 times a month, during the afternoon working hours. The dizziness feels like her head is spinning around. Sometimes the episodes last throughout the night and she has to take Tylenol. Sometimes the dizziness is associated with headaches. These dizzy spells have been going on for more than 6 months.     Her exercise routine includes going for a walk.         7/31/2025      7:15 AM   Additional Questions   Roomed by JENNIFER   Accompanied by , through our telephone services.         7/31/2025     7:15 AM   Patient Reported Additional Medications   Patient reports taking the following new medications NO        Healthy Habits:     Getting at least 3 servings of Calcium per day:  Yes    Bi-annual eye exam:  Yes    Dental care twice a year:  Yes    Sleep apnea or symptoms of sleep apnea:  None    Diet:  Regular (no restrictions)    Frequency of exercise:  None    Duration of exercise:  N/A    Taking medications regularly:  No    Barriers to taking medications:  Not applicable    Medication side effects:  Not applicable    Additional concerns today:  No    Advance Care Planning    Discussed advance care planning with patient; informed AVS has link to [employer]. Portuguese version printed from [employer].        7/31/2025   General Health   How would you rate your overall physical health? Good   Feel stress (tense, anxious, or unable to sleep) Only a little   (!) STRESS CONCERN      7/31/2025   Nutrition   Three or more servings of calcium each day? (!) NO   Diet: Regular (no restrictions)   How many servings of fruit and vegetables per day? (!) 0-1   How many sweetened beverages each day? 0-1         7/31/2025   Exercise   Days per week of moderate/strenous exercise 0 days   (!) EXERCISE CONCERN      7/31/2025   Social Factors   Frequency of gathering with friends or relatives Once a week   Worry food won't last until get money to buy more No   Food not last or not have enough money for food? No   Do you have housing? (Housing is defined as stable permanent housing and does not include staying outside in a car, in a tent, in an abandoned building, in an overnight shelter, or couch-surfing.) No   Are you worried about losing your housing? No   Lack of transportation? No   Unable to get utilities (heat,electricity)? No   Want help with housing or utility concern? No  "  (!) HOUSING CONCERN PRESENT      7/31/2025   Dental   Dentist two times every year? (!) NO     Today's PHQ-2 Score:       7/31/2025     7:14 AM   PHQ-2 ( 1999 Pfizer)   Q1: Little interest or pleasure in doing things 0    Q2: Feeling down, depressed or hopeless 0    PHQ-2 Score 0    Q1: Little interest or pleasure in doing things Not at all   Q2: Feeling down, depressed or hopeless Not at all   PHQ-2 Score 0       Proxy-reported           7/31/2025   Substance Use   Alcohol more than 3/day or more than 7/wk No   Do you use any other substances recreationally? No     Social History     Tobacco Use    Smoking status: Never    Smokeless tobacco: Never   Substance Use Topics    Alcohol use: No     Alcohol/week: 0.0 standard drinks of alcohol    Drug use: No          Mammogram Screening - Patient under 40 years of age: Routine Mammogram Screening not recommended.         7/31/2025   STI Screening   New sexual partner(s) since last STI/HIV test? No     History of abnormal Pap smear: No - age 30-64 HPV with reflex Pap every 5 years recommended        12/29/2016    12:00 AM   PAP / HPV   PAP (Historical) NIL            7/31/2025   Contraception/Family Planning   Questions about contraception or family planning No   What are your periods like? Not currently having periods        Reviewed and updated as needed this visit by Provider   Tobacco   Meds  Problems  Med Hx  Surg Hx  Fam Hx          Review of Systems       Objective    Exam  /63   Pulse 81   Temp 97.8  F (36.6  C) (Oral)   Resp 17   Ht 1.448 m (4' 9\")   Wt 44.9 kg (99 lb)   SpO2 99%   BMI 21.42 kg/m     Estimated body mass index is 21.42 kg/m  as calculated from the following:    Height as of this encounter: 1.448 m (4' 9\").    Weight as of this encounter: 44.9 kg (99 lb).    Physical Exam  GENERAL: alert and no distress  EYES: Eyes grossly normal to inspection, PERRL and conjunctivae and sclerae normal  HENT: ear canals and TM's normal, nose " and mouth without ulcers or lesions  NECK: no adenopathy, no asymmetry, masses, or scars  RESP: lungs clear to auscultation - no rales, rhonchi or wheezes  BREAST: normal without masses, tenderness or nipple discharge and no palpable axillary masses or adenopathy  CV: regular rate and rhythm, normal S1 S2, no S3 or S4, no murmur, click or rub, no peripheral edema  ABDOMEN: soft, nontender, no hepatosplenomegaly, no masses and bowel sounds normal  MS: no gross musculoskeletal defects noted, no edema  SKIN: no suspicious lesions or rashes  NEURO: Normal strength and tone, mentation intact and speech normal  PSYCH: mentation appears normal, affect normal/bright    This document serves as a record of the services and decisions personally performed and made by Dr. Roy. It was created on his behalf by Kellen Quan, a trained medical scribe. The creation of this document is based the provider's statements to the medical scribe.  Kellen Quan, 7:36 AM         Signed Electronically by: Mihai Roy MD

## 2025-07-31 NOTE — COMMUNITY RESOURCES LIST (PATIENT PREFERRED LANGUAGE)
Nhà ?  [employer]   Nhà cung c?p ch??ng tr?nh: [employer]  Daily web ch??ng tr?nh : [email or web address]  C?c b??c ti?p zachary: Truy c?p [email or web address]    ??a ?i?m ch??ng tr?nh:   ??a ch?:  [address] [place] [address]   Ritika?ng c?ch:  13.16 mi   S? ?i?n tho?i v?n ph?[phone]    Gi?:   Th? rosette: 8:00 AM - 5:00 PM   Th? ba: 8:00 AM - 5:00 PM   Th? T?: 8:00 AM - 5:00 PM   Th? n?m: 8:00 AM - 5:00 PM   Th? s?u: 8:00 AM - 5:00 PM   Th? b?y: ?ã ??ng   Ch? nh?t: ?ã ??ng     Nhà ? giá r? tr?c rubén?n   Nhà cung c?p ch??ng tr?nh: Affordable [employer] Online  Daily web ch??ng tr?nh : [email or web address]  C?c b??c ti?p zachary: Truy c?p [email or web address]    ??a ?i?m ch??ng tr?nh:   ??a ch?:  [address] [place] [address]   Ritika?ng c?ch:  1031.95 mi     Gi?:   Th? rosette: 8:00 AM - 5:00 PM   Th? ba: 8:00 AM - 5:00 PM   Th? T?: 8:00 AM - 5:00 PM   Th? n?m: 8:00 AM - 5:00 PM   Th? s?u: 8:00 AM - 5:00 PM   Th? b?y: ?ã ??ng   Ch? nh?t: ?ã ??ng

## 2025-07-31 NOTE — COMMUNITY RESOURCES LIST (ENGLISH)
Housing  [employer]   Program Provider: [employer]  Program Website : [email or web address].[email or web address]/  Next Steps: Go to [email or web address]    Program Locations:   Address:  [address] [place] [address]   Distance:  13.16 mi   Office Phone Number: [phone]    Hours:   Monday: 8:00 AM - 5:00 PM   Tuesday: 8:00 AM - 5:00 PM   Wednesday: 8:00 AM - 5:00 PM   Thursday: 8:00 AM - 5:00 PM   Friday: 8:00 AM - 5:00 PM   Saturday: CLOSED   Sunday: CLOSED     Affordable Housing Online   Program Provider: [employer]  Program Website : [email or web address]  Next Steps: Go to [email or web address]    Program Locations:   Address:  [address] [place] [address]   Distance:  1031.95 mi     Hours:   Monday: 8:00 AM - 5:00 PM   Tuesday: 8:00 AM - 5:00 PM   Wednesday: 8:00 AM - 5:00 PM   Thursday: 8:00 AM - 5:00 PM   Friday: 8:00 AM - 5:00 PM   Saturday: CLOSED   Sunday: CLOSED

## 2025-08-04 ENCOUNTER — PATIENT OUTREACH (OUTPATIENT)
Dept: CARE COORDINATION | Facility: CLINIC | Age: 35
End: 2025-08-04
Payer: COMMERCIAL